# Patient Record
Sex: FEMALE | Race: OTHER | HISPANIC OR LATINO | Employment: FULL TIME | URBAN - METROPOLITAN AREA
[De-identification: names, ages, dates, MRNs, and addresses within clinical notes are randomized per-mention and may not be internally consistent; named-entity substitution may affect disease eponyms.]

---

## 2018-04-16 ENCOUNTER — HOSPITAL ENCOUNTER (EMERGENCY)
Facility: HOSPITAL | Age: 28
Discharge: HOME | End: 2018-04-16
Attending: EMERGENCY MEDICINE | Admitting: EMERGENCY MEDICINE
Payer: COMMERCIAL

## 2018-04-16 ENCOUNTER — APPOINTMENT (EMERGENCY)
Dept: RADIOLOGY | Facility: HOSPITAL | Age: 28
End: 2018-04-16
Payer: COMMERCIAL

## 2018-04-16 VITALS
HEART RATE: 66 BPM | OXYGEN SATURATION: 100 % | SYSTOLIC BLOOD PRESSURE: 100 MMHG | WEIGHT: 135 LBS | TEMPERATURE: 98.1 F | DIASTOLIC BLOOD PRESSURE: 66 MMHG | RESPIRATION RATE: 20 BRPM

## 2018-04-16 DIAGNOSIS — O20.0 THREATENED MISCARRIAGE: Primary | ICD-10-CM

## 2018-04-16 LAB
ABO GROUP BLD: NORMAL
B-HCG SERPL-ACNC: ABNORMAL MIU/ML
BACTERIA UR QL AUTO: ABNORMAL /HPF
BASOPHILS # BLD AUTO: 0.01 THOUSANDS/ΜL (ref 0–0.1)
BASOPHILS NFR BLD AUTO: 0 % (ref 0–1)
BILIRUB UR QL STRIP: NEGATIVE
BLD GP AB SCN SERPL QL: NEGATIVE
CLARITY UR: CLEAR
COLOR UR: YELLOW
COLOR, POC: YELLOW
EOSINOPHIL # BLD AUTO: 0.12 THOUSAND/ΜL (ref 0–0.61)
EOSINOPHIL NFR BLD AUTO: 2 % (ref 0–6)
ERYTHROCYTE [DISTWIDTH] IN BLOOD BY AUTOMATED COUNT: 13.6 % (ref 11.6–15.1)
EXT PREG TEST URINE: POSITIVE
GLUCOSE UR STRIP-MCNC: NEGATIVE MG/DL
HCT VFR BLD AUTO: 40.6 % (ref 34.8–46.1)
HGB BLD-MCNC: 13.6 G/DL (ref 11.5–15.4)
HGB UR QL STRIP.AUTO: ABNORMAL
HYALINE CASTS #/AREA URNS LPF: ABNORMAL /LPF
KETONES UR STRIP-MCNC: ABNORMAL MG/DL
LEUKOCYTE ESTERASE UR QL STRIP: NEGATIVE
LYMPHOCYTES # BLD AUTO: 1.42 THOUSANDS/ΜL (ref 0.6–4.47)
LYMPHOCYTES NFR BLD AUTO: 27 % (ref 14–44)
MCH RBC QN AUTO: 28.8 PG (ref 26.8–34.3)
MCHC RBC AUTO-ENTMCNC: 33.5 G/DL (ref 31.4–37.4)
MCV RBC AUTO: 86 FL (ref 82–98)
MONOCYTES # BLD AUTO: 0.43 THOUSAND/ΜL (ref 0.17–1.22)
MONOCYTES NFR BLD AUTO: 8 % (ref 4–12)
NEUTROPHILS # BLD AUTO: 3.21 THOUSANDS/ΜL (ref 1.85–7.62)
NEUTS SEG NFR BLD AUTO: 63 % (ref 43–75)
NITRITE UR QL STRIP: NEGATIVE
NON-SQ EPI CELLS URNS QL MICRO: ABNORMAL /HPF
NRBC BLD AUTO-RTO: 0 /100 WBCS
PH UR STRIP.AUTO: 6.5 [PH] (ref 4.5–8)
PLATELET # BLD AUTO: 153 THOUSANDS/UL (ref 149–390)
PMV BLD AUTO: 9.9 FL (ref 8.9–12.7)
PROT UR STRIP-MCNC: ABNORMAL MG/DL
RBC # BLD AUTO: 4.73 MILLION/UL (ref 3.81–5.12)
RBC #/AREA URNS AUTO: ABNORMAL /HPF
RH BLD: POSITIVE
SP GR UR STRIP.AUTO: >=1.03 (ref 1–1.03)
SPECIMEN EXPIRATION DATE: NORMAL
UROBILINOGEN UR QL STRIP.AUTO: 1 E.U./DL
WBC # BLD AUTO: 5.2 THOUSAND/UL (ref 4.31–10.16)
WBC #/AREA URNS AUTO: ABNORMAL /HPF

## 2018-04-16 PROCEDURE — 81002 URINALYSIS NONAUTO W/O SCOPE: CPT | Performed by: EMERGENCY MEDICINE

## 2018-04-16 PROCEDURE — 81025 URINE PREGNANCY TEST: CPT | Performed by: EMERGENCY MEDICINE

## 2018-04-16 PROCEDURE — 76801 OB US < 14 WKS SINGLE FETUS: CPT

## 2018-04-16 PROCEDURE — 84702 CHORIONIC GONADOTROPIN TEST: CPT | Performed by: EMERGENCY MEDICINE

## 2018-04-16 PROCEDURE — 36415 COLL VENOUS BLD VENIPUNCTURE: CPT | Performed by: EMERGENCY MEDICINE

## 2018-04-16 PROCEDURE — 86850 RBC ANTIBODY SCREEN: CPT | Performed by: EMERGENCY MEDICINE

## 2018-04-16 PROCEDURE — 86901 BLOOD TYPING SEROLOGIC RH(D): CPT | Performed by: EMERGENCY MEDICINE

## 2018-04-16 PROCEDURE — 99284 EMERGENCY DEPT VISIT MOD MDM: CPT

## 2018-04-16 PROCEDURE — 85025 COMPLETE CBC W/AUTO DIFF WBC: CPT | Performed by: EMERGENCY MEDICINE

## 2018-04-16 PROCEDURE — 86900 BLOOD TYPING SEROLOGIC ABO: CPT | Performed by: EMERGENCY MEDICINE

## 2018-04-16 PROCEDURE — 81001 URINALYSIS AUTO W/SCOPE: CPT

## 2018-04-16 NOTE — ED PROVIDER NOTES
History  Chief Complaint   Patient presents with    Vaginal Bleeding - Pregnant     Pt states that she pregnant and is having vaginal bleeding with nausea  Pt thinks she is about 2 months pregnant  HPI     80-year-old female  presents for vaginal bleeding  The patient's two months by dates pregnant  Recently moved from [place] after moving from [facility] to in December of last year  He has only been in the [facility] for three weeks  Reports that today she developed suprapubic abdominal pain with associated vaginal bleeding described as a few clots that has since improved  Pain is aching  Does not radiate  No other associated symptoms no other modifying factors  Last normal menstrual period was   She denies any vaginal discharge  No fevers or chills  Assessment plan vaginal bleeding early pregnancy with pelvic pain  Will do transvaginal ultrasound the patient had a transabdominal that was equivocal and did not show any intrauterine pregnancy  Will check type and screen as well as a beta HCG and CBC  Prior to Admission Medications   Prescriptions Last Dose Informant Patient Reported? Taking? Prenatal Vit-Fe Fumarate-FA (PRENATAL VITAMIN PO)   Yes Yes   Sig: Take by mouth      Facility-Administered Medications: None       History reviewed  No pertinent past medical history  Past Surgical History:   Procedure Laterality Date     SECTION         History reviewed  No pertinent family history  I have reviewed and agree with the history as documented  Social History   Substance Use Topics    Smoking status: Never Smoker    Smokeless tobacco: Never Used    Alcohol use No        Review of Systems   Constitutional: Negative for chills, fatigue and fever  Eyes: Negative for photophobia and visual disturbance  Respiratory: Negative for cough and shortness of breath  Cardiovascular: Negative for chest pain, palpitations and leg swelling  Gastrointestinal: Positive for abdominal pain  Negative for diarrhea, nausea and vomiting  Endocrine: Negative for polydipsia and polyuria  Genitourinary: Positive for vaginal bleeding  Negative for decreased urine volume, difficulty urinating, dysuria and frequency  Musculoskeletal: Negative for back pain, neck pain and neck stiffness  Skin: Negative for color change and rash  Allergic/Immunologic: Negative for environmental allergies and immunocompromised state  Neurological: Negative for dizziness and headaches  Hematological: Negative for adenopathy  Does not bruise/bleed easily  Psychiatric/Behavioral: Negative for dysphoric mood  The patient is not nervous/anxious  Physical Exam  ED Triage Vitals   Temperature Pulse Respirations Blood Pressure SpO2   04/16/18 1443 04/16/18 1443 04/16/18 1443 04/16/18 1443 04/16/18 1443   98 1 °F (36 7 °C) 81 18 104/56 99 %      Temp Source Heart Rate Source Patient Position - Orthostatic VS BP Location FiO2 (%)   04/16/18 1443 04/16/18 1443 04/16/18 1443 04/16/18 1443 --   Oral Monitor Sitting Left arm       Pain Score       04/16/18 1445       8           Orthostatic Vital Signs  Vitals:    04/16/18 1615 04/16/18 1645 04/16/18 1740 04/16/18 1845   BP: 116/73 99/61 97/58 100/66   Pulse: 64 70 62 66   Patient Position - Orthostatic VS: Sitting Sitting Lying Sitting       Physical Exam   Constitutional: She is oriented to person, place, and time  She appears well-developed and well-nourished  No distress  HENT:   Head: Normocephalic and atraumatic  Nose: Nose normal    Eyes: Conjunctivae and EOM are normal  Pupils are equal, round, and reactive to light  No scleral icterus  Neck: Normal range of motion  Neck supple  No JVD present  No tracheal deviation present  No thyromegaly present  Cardiovascular: Normal rate, regular rhythm, normal heart sounds and intact distal pulses  Exam reveals no gallop and no friction rub      Pulmonary/Chest: Effort normal and breath sounds normal  No respiratory distress  She has no wheezes  She has no rales  She exhibits no tenderness  Abdominal: Soft  Bowel sounds are normal  She exhibits no distension and no mass  There is tenderness (suprapubic)  There is no rebound and no guarding  No hernia  Musculoskeletal: Normal range of motion  She exhibits no edema, tenderness or deformity  Neurological: She is alert and oriented to person, place, and time  She has normal reflexes  No cranial nerve deficit  Coordination normal    Skin: Skin is warm and dry  She is not diaphoretic  No erythema  Psychiatric: She has a normal mood and affect  Her behavior is normal    Nursing note and vitals reviewed        ED Medications  Medications - No data to display    Diagnostic Studies  Results Reviewed     Procedure Component Value Units Date/Time    Quantitative hCG [[id number]]  (Abnormal) Collected:  04/16/18 1731    Lab Status:  Final result Specimen:  Blood from Arm, Right Updated:  04/16/18 1821     HCG, Quant 25,326 (H) mIU/mL     Narrative:          Expected Ranges:     Approximate               Approximate HCG  Gestation age          Concentration ( mIU/mL)  _____________          ______________________   [facility]                      HCG values  0 2-1                       5-50  1-2                           2-3                         100-5000  3-4                         500-06960  4-5                         1000-35596  5-6                         [id number]  6-8                         [id number]  8-12                        [id number]    CBC and differential [[id number]]  (Normal) Collected:  04/16/18 1731    Lab Status:  Final result Specimen:  Blood from Arm, Right Updated:  04/16/18 1748     WBC 5 20 Thousand/uL      RBC 4 73 Million/uL      Hemoglobin 13 6 g/dL      Hematocrit 40 6 %      MCV 86 fL      MCH 28 8 pg      MCHC 33 5 g/dL      RDW 13 6 %      MPV 9 9 fL      Platelets 084 Thousands/uL      nRBC 0 /100 WBCs      Neutrophils Relative 63 %      Lymphocytes Relative 27 %      Monocytes Relative 8 %      Eosinophils Relative 2 %      Basophils Relative 0 %      Neutrophils Absolute 3 21 Thousands/µL      Lymphocytes Absolute 1 42 Thousands/µL      Monocytes Absolute 0 43 Thousand/µL      Eosinophils Absolute 0 12 Thousand/µL      Basophils Absolute 0 01 Thousands/µL     Urine Microscopic [[id number]]  (Abnormal) Collected:  04/16/18 1612    Lab Status:  Final result Specimen:  Urine from Urine, Other Updated:  04/16/18 1638     RBC, UA 4-10 (A) /hpf      WBC, UA 2-4 (A) /hpf      Epithelial Cells Moderate (A) /hpf      Bacteria, UA Occasional /hpf      Hyaline Casts, UA 3-5 (A) /lpf     POCT pregnancy, urine [[id number]]  (Normal) Resulted:  04/16/18 1609    Lab Status:  Final result Updated:  04/16/18 1618     EXT PREG TEST UR (Ref: Negative) POSITIVE    POCT urinalysis dipstick [[id number]]  (Normal) Resulted:  04/16/18 1609    Lab Status:  Final result Specimen:  Urine Updated:  04/16/18 1618     Color, UA yellow    ED Urine Macroscopic [[id number]]  (Abnormal) Collected:  04/16/18 1612    Lab Status:  Final result Specimen:  Urine Updated:  04/16/18 1609     Color, UA Yellow     Clarity, UA Clear     pH, UA 6 5     Leukocytes, UA Negative     Nitrite, UA Negative     Protein, UA Trace (A) mg/dl      Glucose, UA Negative mg/dl      Ketones, UA Trace (A) mg/dl      Urobilinogen, UA 1 0 E U /dl      Bilirubin, UA Negative     Blood, UA Moderate (A)     Specific Gravity, UA >=1 030    Narrative:       CLINITEK RESULT                 US OB < 14 weeks with transvaginal   Final Result by Phil Bonilla MD (04/16 1738)      Single intrauterine pregnancy of 5-6 weeks gestational age  Fetal cardiac activity not detected, concerning for fetal demise  Recommend follow-up exam within one week to determine viability              Workstation performed: [id number]               Procedures  Procedures      Phone Consults  ED Phone Contact    ED Course  ED Course as of Apr 18 0514   Mon Apr 16, 2018   [id number] Spoke with [facility] resident, they are aware of the patient and will follow up with her                                MDM  Number of Diagnoses or Management Options  Threatened miscarriage: new and requires workup  Diagnosis management comments: Transvaginal ultrasound shows an intrauterine pregnancy with no fetal heart rate consistent with possible fetal demise  I spoke with the on-call OBGYN resident to establish follow-up for the patient, they are aware of the patient and the patient was given their number is well  She will have a repeat beta HCG in 48 hours and follow up with the OBGYN clinic       Amount and/or Complexity of Data Reviewed  Clinical lab tests: reviewed and ordered  Tests in the radiology section of CPT®: reviewed and ordered  Tests in the medicine section of CPT®: reviewed and ordered  Independent visualization of images, tracings, or specimens: yes      CritCare Time    Disposition  Final diagnoses:   Threatened miscarriage     Time reflects when diagnosis was documented in both MDM as applicable and the Disposition within this note     Time User Action Codes Description Comment    4/16/2018  7:07 PM Gaston Larger Add [O20 0] Threatened miscarriage       ED Disposition     ED Disposition Condition Comment    Discharge  [place] discharge to home/self care      Condition at discharge: Good        Follow-up Information     Follow up With Specialties Details Why [address] [facility] Obstetrics and Gynecology Schedule an appointment as soon as possible for a visit in 2 days  [address] [address] [phone]        Discharge Medication List as of 4/16/2018  7:09 PM      CONTINUE these medications which have NOT CHANGED    Details   Prenatal Vit-Fe Fumarate-FA (PRENATAL VITAMIN PO) Take by mouth, Historical Med             Outpatient Discharge Orders  hCG, quantitative   Standing Status: Future  Standing Exp  Date: 04/16/19         ED Provider  Attending physically available and evaluated Margaret Clayton I managed the patient along with the ED Attending      Electronically Signed by         Ting Jackson DO  04/18/18 An

## 2018-04-16 NOTE — DISCHARGE INSTRUCTIONS
Amenaza de aborto natural   LO QUE NECESITA SABER:   Medardo Million de aborto ocurre cuando se tiene sangrado vaginal dentro de las primeras 20 semanas de embarazo  Eso indica que podría ocurrir un aborto natural  Medardo Million de un aborto natural también se le conoce thao naresh amenaza de pérdida del [facility]  INSTRUCCIONES SOBRE EL SAPNA HOSPITALARIA:   Regrese a la jose miguel de emergencias si:   · Se siente débil o que se desmaya  · Tiene dolor o cólicos en el abdomen o en la espalda que empeoran  · Tiene sangrado vaginal que en naresh hora empapa por lo menos 1 toalla sanitaria  · Le sale un material que parece tejido o coágulos grandes  Comuníquese con braden médico o obstreta si:   · Usted tiene fiebre  · Tiene problemas para orinar, siente ardor o la necesidad de orinar con frecuencia  · Tiene sangrado vaginal nuevo o que empeora  · Tiene dolor o comezón vaginal o flujo vaginal de color amarillo o christos o maloliente  · Usted tiene preguntas o inquietudes acerca de braden condición o cuidado  Cuidados personales:  Los siguientes podrían ayudar a controlar los síntomas y disminuir braden riesgo de un aborto natural:  · No se ponga nada dentro de braden vagina  No tenga relaciones sexuales, no tome duchas vaginales ni use tampones  Estas acciones pueden aumentar braden riesgo de naresh infección o de un aborto natural      · Repose según le indicaron  No practique ningún ejercicio ni actividades vigorosas  Estas actividades pueden causar un parto prematuro o un aborto natural  Pregunte a braden médico cuáles actividades físicas son las apropiadas para usted  Manténgase saludable yonathan el embarazo:   · Consuma alimentos saludables y variados  Los alimentos sanos pueden ayudarla a obtener las proteínas y calorías adicionales que usted necesita yonathan el [facility]  Los alimentos saludables incluyen frutas, verduras, pan integral, productos lácteos bajos en grasa, frijoles, gabe magras y pescado   Evite la carne y pescado crudos o que no estén cocinados completamente  Consulte con palmer médico en [place] que sea necesario que siga naresh dieta especial      · [place] vitaminas prenatales según las indicaciones  Estas ayudan a obtener la cantidad correcta de vitaminas y minerales  También podrían ayudar a disminuir el riesgo de ciertos defectos de nacimiento  · No consuma alcohol ni drogas ilegales  Estos pueden aumentar el riesgo de un aborto espontáneo o perjudicar al bebé  · No fume  La nicotina y otros químicos en los cigarrillos y cigarros pueden perjudicar a palmer bebé y provocar un aborto natural o un parto prematuro  Pida información a palmer médico si usted actualmente fuma y necesita ayuda para dejar de fumar  Los cigarrillos electrónicos o tabaco sin humo todavía contienen nicotina  No use estos productos  · Disminuya el riesgo de naresh infección  Siempre lave jon braden antes de comer o preparar alimentos  No pase tiempo con gente que está enferma  Pregúntele a palmer médico si necesita vacunas thao la vacuna contra la gripe o la hepatitis B  Las vacunas pueden disminuir palmer riesgo de contraer infecciones que pueden causar un aborto espontáneo  · Controle jon afecciones médicas  Charley Plaster control palmer presión arterial y azúcar en la bret  Mantenga un peso saludable yonathan Chiara Kincaid  Programe naresh lizzy con palmer obstétrico thao se le indique:  Es posible que usted deba acudir a consulta con palmer ginecólogo frecuentemente para que le ordene ecografías o más exámenes de Elsie  Anote jon preguntas para que se acuerde de hacerlas yonathan jon visitas  © 2017 [address] Information is for End User's use only and may not be sold, redistributed or otherwise used for commercial purposes  All illustrations and images included in CareNotes® are the copyrighted property of A D A M , Inc  or Duc aVlenzuela  Esta información es sólo para uso en educación   Palmer intención no es darle un consejo [employer] enfermedades o tratamientos  Colsulte con braden Murlene Lusty farmacéutico antes de seguir cualquier régimen médico para saber si es seguro y efectivo para usted

## 2018-04-19 ENCOUNTER — OFFICE VISIT (OUTPATIENT)
Dept: OBGYN CLINIC | Facility: HOSPITAL | Age: 28
End: 2018-04-19
Payer: COMMERCIAL

## 2018-04-19 VITALS
WEIGHT: 134 LBS | SYSTOLIC BLOOD PRESSURE: 101 MMHG | HEIGHT: 64 IN | DIASTOLIC BLOOD PRESSURE: 66 MMHG | BODY MASS INDEX: 22.88 KG/M2 | HEART RATE: 65 BPM

## 2018-04-19 DIAGNOSIS — Z11.3 SCREEN FOR STD (SEXUALLY TRANSMITTED DISEASE): Primary | ICD-10-CM

## 2018-04-19 PROBLEM — N91.2 AMENORRHEA: Status: ACTIVE | Noted: 2018-04-19

## 2018-04-19 PROCEDURE — 99213 OFFICE O/P EST LOW 20 MIN: CPT | Performed by: OBSTETRICS & GYNECOLOGY

## 2018-04-19 PROCEDURE — 87591 N.GONORRHOEAE DNA AMP PROB: CPT | Performed by: STUDENT IN AN ORGANIZED HEALTH CARE EDUCATION/TRAINING PROGRAM

## 2018-04-19 PROCEDURE — 87491 CHLMYD TRACH DNA AMP PROBE: CPT | Performed by: STUDENT IN AN ORGANIZED HEALTH CARE EDUCATION/TRAINING PROGRAM

## 2018-04-19 NOTE — PROGRESS NOTES
GYN visit  Follow up ER for threatened  vs anembryonic pregnancy      28 yo  with LMP of 2018 on no contraception presenting as a follow up from ED for which she went for vaginal bleeding and cramping  Patient was seen in ED on 2018 with vaginal bleeding and abdominal pain and was found to have a BHCG of 25,346 with a transvaginal ultrasound showing a 13mm gestational sac and a fetal pole measuring 4mm with no fetal heart beat  She was sent home with precautions and a BHCG blood work to follow up with OBGYN as soon as possible  She presents today with with scant vaginal bleeding with increase discharge and cramping  Patient has an OB history that is significant for 3  section deliveries  This was unplanned however desired pregnancy if in fact it is a viable pregnancy  Objective:  /66   Pulse 65   Ht 5' 4" (1 626 m)   Wt 60 8 kg (134 lb)   LMP 2018   BMI 23 00 kg/m²   General: Appears calm and in no acute distress  Abdomen: Soft, non-tender, no palpable masses, no rebound or guarding  SSE: No blood in vaginal vault, cervix appears closed, moderate white mucoid discharge, no lesions  Pelvic examination: Normal genitalia with no lesions noted, small mobile uterus, no adnexal masses palpated, no CMT, no tenderness    TVUS:  A single gestational sac is noted in the uterus with a yolk sac measuring 7 x 5 mm  A possible fetal pole is visualized and measure 2 mm  No andexal masses noted    Assessment/Plan:  28 yo  with amenorrhea and a visibile intrauterine gestational sac with abnormal appearing yolk sac most likely representing an early failed pregnancy  Patient was counseled on todays findings and based on what we see this most likely is an abnormal pregnancy s  Given this is a desired pregnancy patient was offered laboratory testing with BHCG and progesterone or repeat ultrasound 1 week from the original ultrasound completed on 2018   Patient desires a repeat transvaginal ultrasound     - Return for viability scan 4/21/2018 or after  - Discussed miscarriage precautions and present to ED with excessive bleeding (2 pads per hour for 2 consecutive hours) or signs of infection    Discussed plan and ultrasound completed with Dr Junior Alexandra

## 2018-04-20 LAB
CHLAMYDIA DNA CVX QL NAA+PROBE: NORMAL
N GONORRHOEA DNA GENITAL QL NAA+PROBE: NORMAL

## 2018-04-24 ENCOUNTER — OFFICE VISIT (OUTPATIENT)
Dept: OBGYN CLINIC | Facility: HOSPITAL | Age: 28
End: 2018-04-24
Payer: COMMERCIAL

## 2018-04-24 VITALS
BODY MASS INDEX: 22.49 KG/M2 | WEIGHT: 135 LBS | HEIGHT: 65 IN | SYSTOLIC BLOOD PRESSURE: 113 MMHG | DIASTOLIC BLOOD PRESSURE: 58 MMHG

## 2018-04-24 DIAGNOSIS — O02.0 ANEMBRYONIC PREGNANCY: Primary | ICD-10-CM

## 2018-04-24 PROCEDURE — 99203 OFFICE O/P NEW LOW 30 MIN: CPT | Performed by: OBSTETRICS & GYNECOLOGY

## 2018-04-24 RX ORDER — MISOPROSTOL 200 UG/1
800 TABLET ORAL ONCE
Status: COMPLETED | OUTPATIENT
Start: 2018-04-24 | End: 2018-04-24

## 2018-04-24 RX ADMIN — MISOPROSTOL 800 MCG: 200 TABLET ORAL at 17:55

## 2018-04-24 NOTE — PATIENT INSTRUCTIONS
Aborto natural   LO QUE NECESITA SABER:   Un aborto natural es la pérdida del feto antes de la 20ª semana de Tim  Un aborto natural también se conoce thao aborto espóntaneo o naresh pérdida temprana del Tim  INSTRUCCIONES SOBRE EL SAPNA HOSPITALARIA:   Busque atención médica de inmediato si:   · Tiene secreción o pus malolientes saliendo de braden vagina  · Usted tiene sangrado vaginal abundante y en el transcurso de naresh hora empapa más de 1 toalla Natali  · Usted tiene dolor abdominal intenso  · Usted siente que braden corazón está latiendo más rápido de lo normal      · Usted se siente sumamente mareado o débil  Pregúntele a braden Samule Rode vitaminas y minerales son adecuados para usted  · Usted tiene naresh temperatura superior a los 100 4°F o escalofrios  · Usted tiene naresh enorme tristeza, carvalho o siente que no puede lidiar con lo que le ha pasado  · Usted tiene preguntas o inquietudes acerca de braden condición o cuidado  Cuidados personales:   · No se ponga nada dentro de braden vagina por 2 semanas o según las indicaciones  No use tampones, duchas vaginales ni lleve acabo el acto sexual  Estas acciones pueden causar naresh infección y dolor  · Use toallas sanitarias según las necesidades  Es posible que usted tenga sangrado o manchado leve por 2 semanas  · No tome mauricio de kenia ni vaya a nadar por 2 semanas o según las indicaciones  Estas acciones pueden aumentar braden riesgo de contraer naresh infección  Sólo tome duchas  · Descanse tanto thao sea necesario  Empiece a hacer un poco más día a día  Regrese a jon actividades diarias thao se le haya indicado  · Consulte con braden médico sobre los métodos anticonceptivos  En [place] que le interese prevenir otro [facility], pregunte a braden médico cuál método [employer] recomienda  · Unirse a un jamey de apoyo o la terapia emocional puede ser beneficioso para afrontar jon sentimientos    Un aborto natural puede ser muy dificil para [place], [place] nara y otros miembros de palmer trang  Después de [facility] pérdida del embarazo se pueden sentir muchos sentimientos  Usted podría sentir naresh carvalho intensa u otros sentimientos  Podría ser beneficioso hablar con Angel Cary, con un familiar o con un consejero acerca de jon sentimientos  Usted también podría estar preocupada por la posibilidad de sufrir otro aborto natural  Consulte con palmer médico acerca de jon preocupaciones  El médico podría ayudarla a disminuir el riesgo de otro aborto  También le podría ayudar a encontrar formas para sobrellevar la carvalho y aflicción que siente  Para más información:   · [employer]nec[employer]  [address] [place] [address]  Phone: [phone]  Phone: [phone]  Web Address: http://arcbazar.com/  [employer]  · [facility]  [address] [facility] , [address] [place] [address]  Web Address: [employer] be  com  Acuda a jon consultas de control con palmer médico según le indicaron  Usted podría necesitar acudir con palmer médico para que le ordene exámenes de bret o naresh ecografía  Anote jon preguntas para que se acuerde de hacerlas yonathan jon visitas  © 2017 [facility] Information is for End User's use only and may not be sold, redistributed or otherwise used for commercial purposes  All illustrations and images included in CareNotes® are the copyrighted property of A D A M , Inc  or Duc Valenzuela  Esta información es sólo para uso en educación  Palmer intención no es darle un consejo médico sobre enfermedades o tratamientos  Colsulte con palmer Dewain Racer farmacéutico antes de seguir cualquier régimen médico para saber si es seguro y efectivo para usted

## 2018-04-24 NOTE — PROGRESS NOTES
GYN VISIT  Follow up viability scan  4/24/2018  Anjelica Hoang  [id number] used  Patient is a 24-year-old G 4 P 3 003 LMP of 2/14/18 on no contraception presenting for follow-up viability scan following initial scan on 04/19/2018 which showed a most likely anembryonic pregnancy with abnormally enlarged yolk sac  Patient initially presented to the ED on 04/14/18 with vaginal bleeding and abdominal pain and at that time was found to have a beta HCG of 25,346 transvaginal ultrasound showing a 13 mm gestational sac and fetal pole measuring 4 mm with no fetal heartbeat  She was sent to follow-up with OBGYN at that time was seen on 4/19/18 by myself  On 04/19/2018 she underwent a repeat pelvic ultrasound which showed a single gestational sac in the uterus with a yolk sac measuring 7 x 5 mm which is not normally large yolk sac  Patient was given the option at that time to have serial beta HCG or return for a follow-up ultrasound and she decided to follow-up for ultrasound which she is presenting for today  Today patient has complaints of continued bleeding which she has had to use 3 pads a day  She has passed clots but denies passing any tissue at this time  She denies any other additional complaints      /58   Ht 5' 5" (1 651 m)   Wt 61 2 kg (135 lb)   LMP 02/14/2018 (Exact Date)   BMI 22 47 kg/m²   General:  Patient appears calm, pleasant and in no acute distress  Pelvic exam:  Minimal active bleeding noted from vagina, Small mobile uterus, no cervical motion tenderness, no adnexal masses palpated    Transvaginal ultrasound:  A single gestational sac in the uterus measuring 2 x 0 7 x 2 9 cm  The gestational sac appears to be collapsed and a teardrop form near the lower uterine segment  A yolk sac measuring 5 x 4 mm present the gestational sac which is smaller than previously measured on 04/19/2018  No adnexal masses seen  Trace amount of free fluid in pelvis which can be normal in a premenopausal female who is having vaginal bleeding    A/P:  30 yo  presenting with anembryonic pregnancy  Discussed miscarriage rate of approximately 20% with patient and that most first trimester are secondary to genetic abnormalities  Patient was reassured that there is nothing that she could have done differently that would of resulted in a different outcome  Patient was counseled on management options including expectant management, medical management with Cytotec placement and surgical management  Risks and benefits of each option were discussed in depth  Patient decided for Cytotec placement at this time  800 mcg of Cytotec were placed vaginally using sterile gloves  Patient was counseled that she would most likely pass the pregnancy in entirety and have vaginal bleeding  She was counseled that in side effects this headache include low-grade fever, chills and diarrhea  Patient was counseled following passage of pregnancy if she continues to have excessive bleeding that is 2 pads an hour for 2 consecutive hours to call or proceed to Ed  To follow up this Friday or sooner if she needs        D/w Dr Massimo Torrez who also assisted with ultrasound    Divya Moore MD

## 2018-04-26 NOTE — ED ATTENDING ATTESTATION
aSri Gomez MD, saw and evaluated the patient  I have discussed the patient with the resident/non-physician practitioner and agree with the resident's/non-physician practitioner's findings, Plan of Care, and MDM as documented in the resident's/non-physician practitioner's note, except where noted  All available labs and Radiology studies were reviewed  At this point I agree with the current assessment done in the Emergency Department  I have conducted an independent evaluation of this patient a history and physical is as follows:    Patient presents with vaginal bleeding and suprapubic abdominal pain  Patient is currently 8 weeks pregnant  Patient recently room moved from [place] to [facility] and has not established care  No additional complaints  Exam: AAOx3, NAD, RRR, CTA, S/NT/ND  A/P:  Vaginal bleeding  Will check serum HCG, type and screen, and obtain ultrasound to evaluate for IUP      Critical Care Time  CritCare Time    Procedures

## 2018-04-27 ENCOUNTER — HOSPITAL ENCOUNTER (EMERGENCY)
Facility: HOSPITAL | Age: 28
Discharge: HOME | End: 2018-04-27
Attending: EMERGENCY MEDICINE | Admitting: EMERGENCY MEDICINE
Payer: COMMERCIAL

## 2018-04-27 ENCOUNTER — APPOINTMENT (EMERGENCY)
Dept: RADIOLOGY | Facility: HOSPITAL | Age: 28
End: 2018-04-27
Payer: COMMERCIAL

## 2018-04-27 VITALS
OXYGEN SATURATION: 99 % | TEMPERATURE: 98.3 F | SYSTOLIC BLOOD PRESSURE: 94 MMHG | RESPIRATION RATE: 16 BRPM | HEART RATE: 63 BPM | WEIGHT: 134 LBS | DIASTOLIC BLOOD PRESSURE: 50 MMHG | BODY MASS INDEX: 22.3 KG/M2

## 2018-04-27 DIAGNOSIS — O03.9 ABORTION: Primary | ICD-10-CM

## 2018-04-27 LAB
ABO GROUP BLD: NORMAL
BACTERIA UR QL AUTO: ABNORMAL /HPF
BASOPHILS # BLD AUTO: 0.02 THOUSANDS/ΜL (ref 0–0.1)
BASOPHILS NFR BLD AUTO: 0 % (ref 0–1)
BILIRUB UR QL STRIP: ABNORMAL
BLD GP AB SCN SERPL QL: NEGATIVE
CLARITY UR: CLEAR
COLOR UR: YELLOW
COLOR, POC: NORMAL
EOSINOPHIL # BLD AUTO: 0.08 THOUSAND/ΜL (ref 0–0.61)
EOSINOPHIL NFR BLD AUTO: 1 % (ref 0–6)
ERYTHROCYTE [DISTWIDTH] IN BLOOD BY AUTOMATED COUNT: 13.5 % (ref 11.6–15.1)
GLUCOSE UR STRIP-MCNC: NEGATIVE MG/DL
HCT VFR BLD AUTO: 34.9 % (ref 34.8–46.1)
HGB BLD-MCNC: 12.2 G/DL (ref 11.5–15.4)
HGB UR QL STRIP.AUTO: ABNORMAL
HYALINE CASTS #/AREA URNS LPF: ABNORMAL /LPF
KETONES UR STRIP-MCNC: ABNORMAL MG/DL
LEUKOCYTE ESTERASE UR QL STRIP: NEGATIVE
LYMPHOCYTES # BLD AUTO: 1.64 THOUSANDS/ΜL (ref 0.6–4.47)
LYMPHOCYTES NFR BLD AUTO: 19 % (ref 14–44)
MCH RBC QN AUTO: 29.1 PG (ref 26.8–34.3)
MCHC RBC AUTO-ENTMCNC: 35 G/DL (ref 31.4–37.4)
MCV RBC AUTO: 83 FL (ref 82–98)
MONOCYTES # BLD AUTO: 0.51 THOUSAND/ΜL (ref 0.17–1.22)
MONOCYTES NFR BLD AUTO: 6 % (ref 4–12)
NEUTROPHILS # BLD AUTO: 6.43 THOUSANDS/ΜL (ref 1.85–7.62)
NEUTS SEG NFR BLD AUTO: 74 % (ref 43–75)
NITRITE UR QL STRIP: NEGATIVE
NON-SQ EPI CELLS URNS QL MICRO: ABNORMAL /HPF
NRBC BLD AUTO-RTO: 0 /100 WBCS
PH UR STRIP.AUTO: 7.5 [PH] (ref 4.5–8)
PLATELET # BLD AUTO: 143 THOUSANDS/UL (ref 149–390)
PMV BLD AUTO: 10 FL (ref 8.9–12.7)
PROT UR STRIP-MCNC: ABNORMAL MG/DL
RBC # BLD AUTO: 4.19 MILLION/UL (ref 3.81–5.12)
RBC #/AREA URNS AUTO: ABNORMAL /HPF
RH BLD: POSITIVE
SP GR UR STRIP.AUTO: 1.02 (ref 1–1.03)
SPECIMEN EXPIRATION DATE: NORMAL
UROBILINOGEN UR QL STRIP.AUTO: 1 E.U./DL
WBC # BLD AUTO: 8.69 THOUSAND/UL (ref 4.31–10.16)
WBC #/AREA URNS AUTO: ABNORMAL /HPF

## 2018-04-27 PROCEDURE — 81002 URINALYSIS NONAUTO W/O SCOPE: CPT | Performed by: EMERGENCY MEDICINE

## 2018-04-27 PROCEDURE — 76817 TRANSVAGINAL US OBSTETRIC: CPT

## 2018-04-27 PROCEDURE — 85025 COMPLETE CBC W/AUTO DIFF WBC: CPT | Performed by: EMERGENCY MEDICINE

## 2018-04-27 PROCEDURE — 86901 BLOOD TYPING SEROLOGIC RH(D): CPT | Performed by: EMERGENCY MEDICINE

## 2018-04-27 PROCEDURE — 86850 RBC ANTIBODY SCREEN: CPT | Performed by: EMERGENCY MEDICINE

## 2018-04-27 PROCEDURE — 86900 BLOOD TYPING SEROLOGIC ABO: CPT | Performed by: EMERGENCY MEDICINE

## 2018-04-27 PROCEDURE — 36415 COLL VENOUS BLD VENIPUNCTURE: CPT | Performed by: EMERGENCY MEDICINE

## 2018-04-27 PROCEDURE — 96374 THER/PROPH/DIAG INJ IV PUSH: CPT

## 2018-04-27 PROCEDURE — 81001 URINALYSIS AUTO W/SCOPE: CPT

## 2018-04-27 PROCEDURE — 76816 OB US FOLLOW-UP PER FETUS: CPT

## 2018-04-27 PROCEDURE — 99284 EMERGENCY DEPT VISIT MOD MDM: CPT

## 2018-04-27 RX ORDER — MISOPROSTOL 200 UG/1
800 TABLET ORAL ONCE
Status: COMPLETED | OUTPATIENT
Start: 2018-04-27 | End: 2018-04-27

## 2018-04-27 RX ORDER — IBUPROFEN 600 MG/1
600 TABLET ORAL EVERY 6 HOURS PRN
Qty: 20 TABLET | Refills: 0 | Status: SHIPPED | OUTPATIENT
Start: 2018-04-27 | End: 2018-05-14

## 2018-04-27 RX ORDER — MORPHINE SULFATE 4 MG/ML
4 INJECTION, SOLUTION INTRAMUSCULAR; INTRAVENOUS ONCE
Status: COMPLETED | OUTPATIENT
Start: 2018-04-27 | End: 2018-04-27

## 2018-04-27 RX ORDER — OXYCODONE HYDROCHLORIDE AND ACETAMINOPHEN 5; 325 MG/1; MG/1
1 TABLET ORAL EVERY 6 HOURS PRN
Qty: 5 TABLET | Refills: 0 | Status: SHIPPED | OUTPATIENT
Start: 2018-04-27 | End: 2018-05-07

## 2018-04-27 RX ADMIN — MORPHINE SULFATE 4 MG: 4 INJECTION INTRAVENOUS at 19:03

## 2018-04-27 RX ADMIN — MISOPROSTOL 800 MCG: 200 TABLET ORAL at 23:29

## 2018-04-27 NOTE — ED ATTENDING ATTESTATION
I, Fiordaliza Sykes DO, saw and evaluated the patient  I have discussed the patient with the resident/non-physician practitioner and agree with the resident's/non-physician practitioner's findings, Plan of Care, and MDM as documented in the resident's/non-physician practitioner's note, except where noted  All available labs and Radiology studies were reviewed  At this point I agree with the current assessment done in the Emergency Department  I have conducted an independent evaluation of this patient a history and physical is as follows:      Critical Care Time  CritCare Time    Procedures     29 yr old fem  with recent fetal demise was tx with misiprositol on Tues  Started with bleeding and cramping last night  Now using up to 4 pads per hour  Exm: mod discomfort with blood in vault  Pln: H/H and OB consult

## 2018-04-27 NOTE — ED PROVIDER NOTES
History  Chief Complaint   Patient presents with    Abdominal Pain     OB put tablets into her vagina Tuesday to help complete the miscarriage  bleeding heavily  using 4 pads an hour  A 77-year-old female who is  with current nonviable pregnancy; presents with vaginal bleeding and lower abdominal cramping  Patient states she had Cytotec placed in her vagina on Tuesday to induce  secondary to her nonviable pregnancy  Patient states she did initially have bleeding however yesterday it significantly worsen  Patient reports using about four pads in an hour for the past several hours  Patient states her lower abdominal cramping has also significantly worsen, stating she "feels as if her ovaries are ripping "  Patient does also complain of fatigue, dizziness and lightheadedness  Patient otherwise denies fever, chills, chest pain, shortness of breath, nausea, vomiting, diarrhea, peripheral edema and rashes  A/P:  Vaginal bleeding and lower abdominal cramping s/p placement of Cytotec 3 days ago  Patient with moderate amount of blood within the vaginal vault  Will check lab work to evaluate for anemia  Will discuss with OB for further treatment and evaluation  History provided by:  Patient and medical records      None       History reviewed  No pertinent past medical history  Past Surgical History:   Procedure Laterality Date     SECTION         History reviewed  No pertinent family history  I have reviewed and agree with the history as documented  Social History   Substance Use Topics    Smoking status: Never Smoker    Smokeless tobacco: Never Used    Alcohol use No        Review of Systems   Gastrointestinal: Positive for abdominal pain ( lower abd cramping)  Genitourinary: Positive for vaginal bleeding  All other systems reviewed and are negative        Physical Exam  ED Triage Vitals   Temperature Pulse Respirations Blood Pressure SpO2   18 1837 18 183 04/27/18 1837 04/27/18 1837 04/27/18 1837   98 3 °F (36 8 °C) 88 16 103/63 100 %      Temp src Heart Rate Source Patient Position - Orthostatic VS BP Location FiO2 (%)   -- 04/27/18 2300 04/27/18 2300 04/27/18 2300 --    Monitor Lying Left arm       Pain Score       04/27/18 1837       Worst Possible Pain           Orthostatic Vital Signs  Vitals:    04/27/18 1837 04/27/18 2300   BP: 103/63 94/50   Pulse: 88 63   Patient Position - Orthostatic VS:  Lying       Physical Exam   General Appearance: alert and oriented, appears uncomfortable, non toxic appearing  Skin:  Warm, dry, intact; pale appearing  HEENT: atraumatic, normocephalic  Neck: Supple, trachea midline  Cardiac: RRR; no murmurs, rub, gallops  Pulmonary: lungs CTAB; no wheezes, rales, rhonchi  Gastrointestinal: abdomen soft, lower abdominal tenderness, nondistended; no guarding or rebound tenderness; good bowel sounds, no mass or bruits  Genitourinary: Chaperoned by PCT  Moderate amount of blood within vaginal vault, no active bleeding      Extremities:  no pedal edema, 2+ pulses; no calf tenderness, no clubbing, no cyanosis  Neuro:  no focal motor or sensory deficits, CN 2-12 grossly intact  Psych:  Normal mood and affect, normal judgement and insight      ED Medications  Medications   morphine (PF) 4 mg/mL injection 4 mg (4 mg Intravenous Given 4/27/18 1903)   misoprostol (CYTOTEC) tablet 800 mcg (800 mcg Vaginal Given 4/27/18 2329)       Diagnostic Studies  Results Reviewed     Procedure Component Value Units Date/Time    POCT urinalysis dipstick [[id number]]  (Normal) Resulted:  04/27/18 2039    Lab Status:  Final result Specimen:  Urine Updated:  04/27/18 2039     Color, UA see results    Urine Microscopic [[id number]]  (Abnormal) Collected:  04/27/18 1959    Lab Status:  Final result Specimen:  Urine Updated:  04/27/18 2037     RBC, UA 30-50 (A) /hpf      WBC, UA None Seen /hpf      Epithelial Cells None Seen /hpf      Bacteria, UA None Seen /hpf Hyaline Casts, UA None Seen /lpf     ED Urine Macroscopic [[id number]]  (Abnormal) Collected:  18    Lab Status:  Final result Specimen:  Urine Updated:  18     Color, UA Yellow     Clarity, UA Clear     pH, UA 7 5     Leukocytes, UA Negative     Nitrite, UA Negative     Protein, UA 30 (1+) (A) mg/dl      Glucose, UA Negative mg/dl      Ketones, UA 40 (2+) (A) mg/dl      Urobilinogen, UA 1 0 E U /dl      Bilirubin, UA Interference- unable to analyze (A)     Blood, UA Moderate (A)     Specific [place], UA 1 020    Narrative:       CLINITEK RESULT    CBC and differential [[id number]]  (Abnormal) Collected:  18    Lab Status:  Final result Specimen:  Blood from Arm, Right Updated:  18     WBC 8 69 Thousand/uL      RBC 4 19 Million/uL      Hemoglobin 12 2 g/dL      Hematocrit 34 9 %      MCV 83 fL      MCH 29 1 pg      MCHC 35 0 g/dL      RDW 13 5 %      MPV 10 0 fL      Platelets 000 (L) Thousands/uL      nRBC 0 /100 WBCs      Neutrophils Relative 74 %      Lymphocytes Relative 19 %      Monocytes Relative 6 %      Eosinophils Relative 1 %      Basophils Relative 0 %      Neutrophils Absolute 6 43 Thousands/µL      Lymphocytes Absolute 1 64 Thousands/µL      Monocytes Absolute 0 51 Thousand/µL      Eosinophils Absolute 0 08 Thousand/µL      Basophils Absolute 0 02 Thousands/µL                  US OB follow up transabdominal approach   Final Result by Zaina Calhoun MD ([id number])         1  Gestational sac without viable pregnancy in the endocervical canal consistent with   in progress  2   Thickened echogenic endometrium    Small amount of blood products not excluded in the endometrial canal          Workstation performed: [id number]         US OB transvaginal    (Results Pending)         Procedures  Procedures      Phone Consults  ED Phone Contact    ED Course  ED Course as of  Hemoglobin 13 on recent labs (11 days ago) Hemoglobin: 12 2    Pt complains of persistent bleeding, dizziness and cramping  Will discuss with OB for further treatment and evaluation  1 Spoke with OB, they are requesting pelvic US to evaluate for products of conception  Will order  They will be down to evaluate the child  2303 OB evaluated the patient and imaging results  Discussed management options with patient  Patient opts for re-dosing of cytotec with outpatient follow up  They will place cytotec tabs prior to discharge  [facility]  CritCare Time    Disposition  Final diagnoses:        Time reflects when diagnosis was documented in both MDM as applicable and the Disposition within this note     Time User Action Codes Description Comment    2018 11:05 PM Tiara Hines Add [O03 9]        ED Disposition     ED Disposition Condition Comment    Discharge  Bushra Anderson discharge to home/self care      Condition at discharge: Good        Follow-up Information     Follow up With Specialties Details [facility] Schedule an appointment as soon as possible for a visit on 2018 For re-evaluation [address] [id number]  [facility] Department Emergency Medicine Go to If symptoms worsen [address]  [phone]  ED, [address] [place] [address], [address]        Patient's Medications   Discharge Prescriptions    IBUPROFEN (MOTRIN) 600 MG TABLET    Take 1 tablet (600 mg total) by mouth every 6 (six) hours as needed for mild pain       Start Date: 2018 End Date: --       Order Dose: 600 mg       Quantity: 20 tablet    Refills: 0    OXYCODONE-ACETAMINOPHEN (PERCOCET) 5-325 MG PER TABLET    Take 1 tablet by mouth every 6 (six) hours as needed for moderate pain for up to 10 days Max Daily Amount: 4 tablets       Start Date: 4/27/2018 End Date: 5/7/2018       Order Dose: 1 tablet       Quantity: 5 tablet    Refills: 0     No discharge procedures on file  ED Provider  Attending physically available and evaluated Norma Alvarez I managed the patient along with the ED Attending      Electronically Signed by         Rosanna Delgado DO  04/27/18 7404

## 2018-04-28 NOTE — DISCHARGE INSTRUCTIONS
Aborto natural   LO QUE NECESITA SABER:   Un aborto natural es la pérdida del feto antes de la 20ª semana de Tim  Un aborto natural también se conoce thao aborto espóntaneo o naresh pérdida temprana del Tim  INSTRUCCIONES SOBRE EL SAPNA HOSPITALARIA:   Regrese a la jose miguel de emergencias si:   · Tiene secreción o pus malolientes saliendo de braden vagina  · Usted tiene sangrado vaginal abundante y en el transcurso de naresh hora empapa más de 1 toalla Ziebach  · Usted tiene dolor abdominal intenso  · Usted siente que braden corazón está latiendo más rápido de lo normal      · Usted se siente sumamente mareado o débil  Pregúntele a braden Carmen Jax vitaminas y minerales son adecuados para usted  · Usted tiene naresh temperatura superior a los 100 4°F o escalofrios  · Usted tiene naresh enorme tristeza, carvalho o siente que no puede lidiar con lo que le ha pasado  · Usted tiene preguntas o inquietudes acerca de braden condición o cuidado  Cuidados personales:   · No se ponga nada dentro de braden vagina por 2 semanas o según las indicaciones  No use tampones, duchas vaginales ni lleve acabo el acto sexual  Estas acciones pueden causar naresh infección y dolor  · Use toallas sanitarias según las necesidades  Es posible que usted tenga sangrado o manchado leve por 2 semanas  · No tome mauricio de kenia ni vaya a nadar por 2 semanas o según las indicaciones  Estas acciones pueden aumentar braden riesgo de contraer naresh infección  Sólo tome duchas  · Descanse tanto thao sea necesario  Empiece a hacer un poco más día a día  Regrese a jon actividades diarias thao se le haya indicado  · Consulte con braden médico sobre los métodos anticonceptivos  En [place] que le interese prevenir otro [facility], pregunte a braden médico cuál método [employer] recomienda  · Unirse a un jamey de apoyo o la terapia emocional puede ser beneficioso para afrontar jon sentimientos    Un aborto natural puede ser muy dificil para [place], [place] nara y otros miembros de palmer trang  Después de [facility] pérdida del embarazo se pueden sentir muchos sentimientos  Usted podría sentir naresh carvalho intensa u otros sentimientos  Podría ser beneficioso hablar con Lavon Bry, con un familiar o con un consejero acerca de jon sentimientos  Usted también podría estar preocupada por la posibilidad de sufrir otro aborto natural  Consulte con palmer médico acerca de jon preocupaciones  El médico podría ayudarla a disminuir el riesgo de otro aborto  También le podría ayudar a encontrar formas para sobrellevar la carvalho y aflicción que siente  Para más información:   · [employer]  [address] [place] [address]  Phone: [phone]  Phone: [phone]  Web Address: http://TreeRing/  ZEB  · March of [facility]  [address]  Web Address: [employer] be  com  Acuda a jon consultas de control con palmer médico según le indicaron  Usted podría necesitar acudir con palmer médico para que le ordene exámenes de bret o naresh ecografía  Anote jon preguntas para que se acuerde de hacerlas yonathan jon visitas  © 2017 2600 [facility] Information is for End User's use only and may not be sold, redistributed or otherwise used for commercial purposes  All illustrations and images included in CareNotes® are the copyrighted property of A D A M , Inc  or Duc Valenzuela  Esta información es sólo para uso en educación  Palmer intención no es darle un consejo médico sobre enfermedades o tratamientos  Colsulte con palmer [place] Taniya farmacéutico antes de seguir cualquier régimen médico para saber si es seguro y efectivo para usted

## 2018-04-28 NOTE — CONSULTS
Consultation - Gynecology   Parisa Brenner 29 y o  female MRN: [id number]  Unit/Bed#: ED 22 Encounter: [id number]    Assessment/Plan     Assessment:  30 yo   s/p Cytotec placement on 18 for suspected failed pregnancy, presentating today with vaginal bleeding and abdominal cramping    Plan:  1  Vaginal bleeding: In the setting of a failed pregnancy , s/p Cytotec placement about 3 days ago  VSS  hgb 13 6 () -> 12 2 ( ) Blood type O positive  No active vaginal bleeding noted on exam  Small amount of blood clots removed from vault  Pelvic U/S showed the  gestational sac without viable pregnancy in the endocervical canal consistent with   in progress  Patient is otherwise asymptomatic except mild-moderate  abdominal cramping at this time  Ambulated to bathroom without difficulties  Discussion with patient on expectant management vs  Medical management ( re-dosing cytotec) vs  Surgical manegement ( D&E)  Risks, benefits , and alternatives discussed with patient  Questions answered  Patient prefers medical management at this time   Discussion was held on the language line with the [address] [place] [address]   - Cytotec 800 mcg placed vaginally ( second dose)  Precautions given  2  Abdominal Cramping: Motrin / Tylenole PRN    3  Dispo: Discharge to home with precautions  Please follow up with signs of infection, hemodynamic instability, worsening pain , or worsening bleeding  Please call the [facility] for a follow up appointment  History of Present Illness   Physician Requesting Consult: Traci Kessler DO  Reason for Consult / Principal Problem: Vaginal bleeding s/p Cytotec placement for failed pregnancy  Subspeciality: General GYN     HPI: Parisa Brenner is a 29y o  year old female, LMP 18  who presents with one day hx of vaginal bleeding and abdominal cramping s/p Cytotec placement on 18 for suspected failed pregnancy  Patient denies any lightheadness, dizziness, syncope, nausea, vomiting, fever , chills, chest pain or dyspnea  Patient reports mild- moderate  abdominal cramping  No additional complaints  Consults    Review of Systems   Constitutional: Negative for chills and fever  Respiratory: Negative for cough, chest tightness, shortness of breath and wheezing  Cardiovascular: Negative for chest pain, palpitations and leg swelling  Gastrointestinal: Positive for abdominal pain  Negative for abdominal distention, constipation, diarrhea, nausea and vomiting  Cramping lower abdominal /pelvic pain   Genitourinary: Positive for pelvic pain and vaginal bleeding  Negative for dysuria and flank pain  Neurological: Negative for dizziness, syncope and light-headedness  Psychiatric/Behavioral: Negative for behavioral problems and confusion  Historical Information   History reviewed  No pertinent past medical history  Past Surgical History:   Procedure Laterality Date     SECTION       OB History    Para Term  AB Living   1             SAB TAB Ectopic Multiple Live Births                  # Outcome Date GA Lbr Juliocesar/2nd Weight Sex Delivery Anes PTL Lv   1 Current                 History reviewed  No pertinent family history  Social History   History   Alcohol Use No     History   Drug Use No     History   Smoking Status    Never Smoker   Smokeless Tobacco    Never Used       Meds/Allergies   all current active meds have been reviewed    No Known Allergies    Objective   Vitals: Blood pressure 103/63, pulse 88, temperature 98 3 °F (36 8 °C), resp  rate 16, weight 60 8 kg (134 lb), last menstrual period 2018, SpO2 100 %  Body mass index is 22 3 kg/m²      No intake or output data in the 24 hours ending 18 2152    Invasive Devices     Peripheral Intravenous Line            Peripheral IV 18 Right Antecubital less than 1 day                Physical Exam   Constitutional: She is oriented to person, place, and time  She appears well-developed and well-nourished  HENT:   Head: Normocephalic and atraumatic  Cardiovascular: Normal rate, regular rhythm and normal heart sounds  Pulmonary/Chest: Effort normal and breath sounds normal    Abdominal: Soft  She exhibits no distension  There is no tenderness  There is no rebound and no guarding  Genitourinary: Vagina normal and uterus normal    Genitourinary Comments: SSE: no active vaginal bleeding, small amount of blood clot evacuated from vault  Cervical os closed    No uterine tenderness or adnexal fullness on bimanual exam      No CMT   No abnormal vaginal dicharge   Neurological: She is alert and oriented to person, place, and time  Psychiatric: She has a normal mood and affect  Her behavior is normal    Nursing note and vitals reviewed  Pelvic US 18: IMPRESSION:        1  Gestational sac without viable pregnancy in the endocervical canal consistent with   in progress  2   Thickened echogenic endometrium  Small amount of blood products not excluded in the endometrial canal    Lab Results: I have personally reviewed pertinent reports  Imaging Studies: I have personally reviewed pertinent reports  Code Status: Full code    Counseling / Coordination of Care  Total floor / unit time spent today 30  minutes  Greater than 50% of total time was spent with the patient and / or family counseling and / or coordination of care        Srinivasan Vanegas   2018

## 2018-04-28 NOTE — ED NOTES
Patient transported to [facility] Susanna Benjamin RN  04/27/18 2036

## 2018-04-30 ENCOUNTER — OFFICE VISIT (OUTPATIENT)
Dept: OBGYN CLINIC | Facility: HOSPITAL | Age: 28
End: 2018-04-30
Payer: COMMERCIAL

## 2018-04-30 VITALS
WEIGHT: 133 LBS | HEIGHT: 65 IN | BODY MASS INDEX: 22.16 KG/M2 | SYSTOLIC BLOOD PRESSURE: 112 MMHG | TEMPERATURE: 98 F | DIASTOLIC BLOOD PRESSURE: 77 MMHG

## 2018-04-30 DIAGNOSIS — O02.1 MISSED ABORTION: Primary | ICD-10-CM

## 2018-04-30 DIAGNOSIS — R10.30 LOWER ABDOMINAL PAIN: ICD-10-CM

## 2018-04-30 PROCEDURE — 99213 OFFICE O/P EST LOW 20 MIN: CPT | Performed by: OBSTETRICS & GYNECOLOGY

## 2018-04-30 PROCEDURE — 88305 TISSUE EXAM BY PATHOLOGIST: CPT | Performed by: PATHOLOGY

## 2018-04-30 RX ORDER — IBUPROFEN 600 MG/1
600 TABLET ORAL ONCE
Status: SHIPPED | OUTPATIENT
Start: 2018-04-30

## 2018-04-30 NOTE — PATIENT INSTRUCTIONS
Aborto natural   LO QUE NECESITA SABER:   Un aborto natural es la pérdida del feto antes de la 20ª semana de Tim  Un aborto natural también se conoce thao aborto espóntaneo o naresh pérdida temprana del Tim  INSTRUCCIONES SOBRE EL SAPNA HOSPITALARIA:   Regrese a la jose miguel de emergencias si:   · Tiene secreción o pus malolientes saliendo de braden vagina  · Usted tiene sangrado vaginal abundante y en el transcurso de naresh hora empapa más de 1 toalla Natali  · Usted tiene dolor abdominal intenso  · Usted siente que braden corazón está latiendo más rápido de lo normal      · Usted se siente sumamente mareado o débil  Pregúntele a braden [place] vitaminas y minerales son adecuados para usted  · Usted tiene naresh temperatura superior a los 100 4°F o escalofrios  · Usted tiene naresh enorme tristeza, carvalho o siente que no puede lidiar con lo que le ha pasado  · Usted tiene preguntas o inquietudes acerca de braden condición o cuidado  Cuidados personales:   · No se ponga nada dentro de braden vagina por 2 semanas o según las indicaciones  No use tampones, duchas vaginales ni lleve acabo el acto sexual  Estas acciones pueden causar naresh infección y dolor  · Use toallas sanitarias según las necesidades  Es posible que usted tenga sangrado o manchado leve por 2 semanas  · No tome mauricio de kenia ni vaya a nadar por 2 semanas o según las indicaciones  Estas acciones pueden aumentar braden riesgo de contraer naresh infección  Sólo tome duchas  · Descanse tanto thao sea necesario  Empiece a hacer un poco más día a día  Regrese a jon actividades diarias thao se le haya indicado  · Consulte con braden médico sobre los métodos anticonceptivos  En [place] que le interese prevenir otro [facility], pregunte a braden médico cuál método [employer] recomienda  · Unirse a un jamey de apoyo o la terapia emocional puede ser beneficioso para afrontar jon sentimientos    Un aborto natural puede ser muy dificil para [place], [place] nara y otros miembros de palmer trang  Después de Han Rizvi pérdida del embarazo se pueden sentir muchos sentimientos  Usted podría sentir naresh carvalho intensa u otros sentimientos  Podría ser beneficioso hablar con Stephanie Osgood, con un familiar o con un consejero acerca de jon sentimientos  Usted también podría estar preocupada por la posibilidad de sufrir otro aborto natural  Consulte con palmer médico acerca de jon preocupaciones  El médico podría ayudarla a disminuir el riesgo de otro aborto  También le podría ayudar a encontrar formas para sobrellevar la carvalho y aflicción que siente  Para más información:   · [employer]nec[employer]  [address] [place] [address]  Phone: [phone]  Phone: [phone]  Web Address: http://Jin-Magic/  org  · March of [facility]AVIORAL HE[facility]  [address] [facility] , [address] [place] [address]  Web Address: [employer]   com  Acuda a jon consultas de control con palmer médico según le indicaron  Usted podría necesitar acudir con palmer médico para que le ordene exámenes de bret o naresh ecografía  Anote jon preguntas para que se acuerde de hacerlas yonathan jon visitas  © 2017 2600 [facility] Information is for End User's use only and may not be sold, redistributed or otherwise used for commercial purposes  All illustrations and images included in CareNotes® are the copyrighted property of A D A M , Inc  or Duc Valenzuela  Esta información es sólo para uso en educación  Palmer intención no es darle un consejo médico sobre enfermedades o tratamientos  Colsulte con palmer Karely Every farmacéutico antes de seguir cualquier régimen médico para saber si es seguro y efectivo para usted

## 2018-04-30 NOTE — LETTER
April 30, 2018     Patient: Mary Obando   YOB: 1990   Date of Visit: 4/30/2018       To Whom it May Concern:    Mary Obando is under my professional care  She was seen in my office on 4/30/2018  Please excuse Daya Phlegm from work 4/30/2018 to 5/1/2018  If you have any questions or concerns, please don't hesitate to call           Sincerely,          Agustin Montana MD        CC: No Recipients

## 2018-04-30 NOTE — PROGRESS NOTES
GYN visit  F/u visit missed AB   2018  Ekonomidis  2018    28 yo [id number] presenting for follow up for missed AB s/p cytotec placement x2 on 18 and 2018  Patient states she is having cramping and vaginal bleeding and has passed 2 sizeable clots that she believes contained tissue on Saturday  She continues to have bleeding  She denies any fever but states she had an episode of chills once and feels tired or "wiped out"  History as follows:   LMP 18: Presented to ED with vaginal bleeding and was noted to have +BHCG with IUP visualized on ultrasound with no cardiac activity  18: Presented to [facility] and with continued vaginal bleeding and was noted to have gestational sac with large irregular yolk sac with no fetal pole clearly visualized  18: Patient presented for follow up ultrasound/ viability scan and had similar findings as previous week and diagnosed with anembryonic pregnancy/ missed   Cytotec 800 mcg dose #1  placed  18: Patient presented to ED with vaginal bleeding and pelvic pain  Was noted to have gestational sac in lower uterine segment  Cytotec 800 mcg dose #2 placed  /77   Temp 98 °F (36 7 °C)   Ht 5' 5" (1 651 m)   Wt 60 3 kg (133 lb)   BMI 22 13 kg/m²   General: Appears calm and in no acute distress  TVUS: Clot material noted in uterus- no visible gestational sac  SSE: Cervix appears visually dilated with tissue visible in cervical os, blood seen in vaginal vault- tissue was removed using ring forceps with a total of 50cc tissue/clot material removed and approximately 100cc blood loss noted during this time      A/P:  28 yo [id number] s/p removal of POC from cervical os following missed AB   - POC sent to pathology for tissue examination  - Patient given 600mg Motrin PO following removal of POC  - Discussed contraception with patient and she will return at a later time to discuss   - Patient was advised nothing per vagina for next 2 weeks  - She was told for pain she may take motrin 600mg PO Q6hrs and Tylenol 325mg PO Q4hrs  - She was given warning signs of infection and excessive bleeding and instructed to present to ED with any of these problems  - To return in 2 weeks for a follow up visit and to discuss contraception or sooner if needed      Patient seen and examined with Dr Shavon Summers

## 2018-05-14 ENCOUNTER — OFFICE VISIT (OUTPATIENT)
Dept: OBGYN CLINIC | Facility: HOSPITAL | Age: 28
End: 2018-05-14
Payer: COMMERCIAL

## 2018-05-14 VITALS
SYSTOLIC BLOOD PRESSURE: 97 MMHG | HEART RATE: 66 BPM | BODY MASS INDEX: 21.92 KG/M2 | HEIGHT: 66 IN | DIASTOLIC BLOOD PRESSURE: 63 MMHG | WEIGHT: 136.4 LBS

## 2018-05-14 DIAGNOSIS — O02.1 MISSED ABORTION: Primary | ICD-10-CM

## 2018-05-14 PROCEDURE — 99213 OFFICE O/P EST LOW 20 MIN: CPT | Performed by: OBSTETRICS & GYNECOLOGY

## 2018-05-14 NOTE — PROGRESS NOTES
Assessment/Plan:    1  Missed    -status post 2 doses of Cytotec, with passage of products of conception   -patient will monitor bleeding and return to clinic if bleeding increases   -continue Motrin 600 mg Q 6h as needed for pain/cramping  2  Contraception   -patient declines contraception at this time  Subjective:      Patient ID: Stephen Ahn is a 29 y o  female  Translation via language line    28 yo [id number] here for f/u for MAB dx on   She received Cytotec on  and   Since that time she notes that she has had vaginal bleeding and passage of clot and tissue  Patient states today that she passed another small amount of what appeared to be tissue as well as blood clots  She notes that she is still having some cramping  Pelvic exam today notable for a 3 cm blood clot in vaginal vault, but no active bleeding  Transvaginal ultrasound (performed with Dr Belle Holguin) with no evidence of gestational sac or products of conception  Endometrial lining measured 0 75 cm  Discussed with patient that it appears that she has passed all products of conception and that only some blood clots noted in endometrial cavity  Discussed with patient that she may expect to have some more bleeding, but that if bleeding exceeds saturation of a pad every hour that she should contact the office immediately  Discuss contraceptive options with patient, however patient declines all contraception at this time and states that she is not planning on being sexually active    Discussed with patient that if she is sexually active that she should use condoms and return to discuss contraception        The following portions of the patient's history were reviewed and updated as appropriate: allergies, current medications, past family history, past medical history, past social history, past surgical history and problem list     History as follows (from previous visit):   LMP 18: Presented to ED with vaginal bleeding and was noted to have +BHCG with IUP visualized on ultrasound with no cardiac activity  18: Presented to [facility] and with continued vaginal bleeding and was noted to have gestational sac with large irregular yolk sac with no fetal pole clearly visualized  18: Patient presented for follow up ultrasound/ viability scan and had similar findings as previous week and diagnosed with anembryonic pregnancy/ missed   Cytotec 800 mcg dose #1  placed  18: Patient presented to ED with vaginal bleeding and pelvic pain  Was noted to have gestational sac in lower uterine segment  Cytotec 800 mcg dose #2 placed  Review of Systems   Constitutional: Negative  HENT: Negative  Respiratory: Negative  Cardiovascular: Negative  Gastrointestinal: Negative  Genitourinary: Positive for pelvic pain and vaginal bleeding  Objective:      BP 97/63 (BP Location: Right arm, Patient Position: Sitting, Cuff Size: Standard)   Pulse 66   Ht 5' 6" (1 676 m)   Wt 61 9 kg (136 lb 6 4 oz)   BMI 22 02 kg/m²          Physical Exam   Constitutional: She is oriented to person, place, and time  She appears well-developed and well-nourished  No distress  HENT:   Head: Normocephalic and atraumatic  Cardiovascular: Normal rate  Pulmonary/Chest: Effort normal  No respiratory distress  Abdominal: She exhibits no distension  There is no tenderness  Genitourinary:   Genitourinary Comments: Small 3-4cm clot in vaginal vault, no active bleeding from cervical os noted     Neurological: She is alert and oriented to person, place, and time  Skin: Skin is warm and dry  Psychiatric: She has a normal mood and affect   Her behavior is normal

## 2018-07-17 ENCOUNTER — HOSPITAL ENCOUNTER (EMERGENCY)
Facility: HOSPITAL | Age: 28
Discharge: HOME | End: 2018-07-17
Attending: EMERGENCY MEDICINE
Payer: COMMERCIAL

## 2018-07-17 VITALS
RESPIRATION RATE: 16 BRPM | OXYGEN SATURATION: 99 % | BODY MASS INDEX: 21.79 KG/M2 | SYSTOLIC BLOOD PRESSURE: 117 MMHG | DIASTOLIC BLOOD PRESSURE: 64 MMHG | HEART RATE: 85 BPM | TEMPERATURE: 98.1 F | WEIGHT: 135 LBS

## 2018-07-17 DIAGNOSIS — Z34.90 PREGNANCY: Primary | ICD-10-CM

## 2018-07-17 DIAGNOSIS — R11.2 NAUSEA AND VOMITING: ICD-10-CM

## 2018-07-17 DIAGNOSIS — R19.7 DIARRHEA: ICD-10-CM

## 2018-07-17 DIAGNOSIS — R10.9 ABDOMINAL PAIN: ICD-10-CM

## 2018-07-17 LAB
ALBUMIN SERPL BCP-MCNC: 3.2 G/DL (ref 3.5–5)
ALP SERPL-CCNC: 63 U/L (ref 46–116)
ALT SERPL W P-5'-P-CCNC: 17 U/L (ref 12–78)
ANION GAP SERPL CALCULATED.3IONS-SCNC: 3 MMOL/L (ref 4–13)
AST SERPL W P-5'-P-CCNC: 9 U/L (ref 5–45)
BACTERIA UR QL AUTO: NORMAL /HPF
BASOPHILS # BLD AUTO: 0.03 THOUSANDS/ΜL (ref 0–0.1)
BASOPHILS NFR BLD AUTO: 1 % (ref 0–1)
BILIRUB SERPL-MCNC: 0.16 MG/DL (ref 0.2–1)
BILIRUB UR QL STRIP: NEGATIVE
BUN SERPL-MCNC: 11 MG/DL (ref 5–25)
CALCIUM SERPL-MCNC: 8.8 MG/DL (ref 8.3–10.1)
CHLORIDE SERPL-SCNC: 104 MMOL/L (ref 100–108)
CLARITY UR: CLEAR
CO2 SERPL-SCNC: 27 MMOL/L (ref 21–32)
COLOR UR: YELLOW
COLOR, POC: YELLOW
CREAT SERPL-MCNC: 0.7 MG/DL (ref 0.6–1.3)
EOSINOPHIL # BLD AUTO: 0.17 THOUSAND/ΜL (ref 0–0.61)
EOSINOPHIL NFR BLD AUTO: 3 % (ref 0–6)
ERYTHROCYTE [DISTWIDTH] IN BLOOD BY AUTOMATED COUNT: 15.2 % (ref 11.6–15.1)
EXT PREG TEST URINE: POSITIVE
GFR SERPL CREATININE-BSD FRML MDRD: 118 ML/MIN/1.73SQ M
GLUCOSE SERPL-MCNC: 79 MG/DL (ref 65–140)
GLUCOSE UR STRIP-MCNC: NEGATIVE MG/DL
HCT VFR BLD AUTO: 37.6 % (ref 34.8–46.1)
HGB BLD-MCNC: 11.9 G/DL (ref 11.5–15.4)
HGB UR QL STRIP.AUTO: ABNORMAL
HYALINE CASTS #/AREA URNS LPF: NORMAL /LPF
IMM GRANULOCYTES # BLD AUTO: 0.02 THOUSAND/UL (ref 0–0.2)
IMM GRANULOCYTES NFR BLD AUTO: 0 % (ref 0–2)
KETONES UR STRIP-MCNC: NEGATIVE MG/DL
LEUKOCYTE ESTERASE UR QL STRIP: NEGATIVE
LIPASE SERPL-CCNC: 163 U/L (ref 73–393)
LYMPHOCYTES # BLD AUTO: 1.79 THOUSANDS/ΜL (ref 0.6–4.47)
LYMPHOCYTES NFR BLD AUTO: 28 % (ref 14–44)
MCH RBC QN AUTO: 25.2 PG (ref 26.8–34.3)
MCHC RBC AUTO-ENTMCNC: 31.6 G/DL (ref 31.4–37.4)
MCV RBC AUTO: 80 FL (ref 82–98)
MONOCYTES # BLD AUTO: 0.58 THOUSAND/ΜL (ref 0.17–1.22)
MONOCYTES NFR BLD AUTO: 9 % (ref 4–12)
NEUTROPHILS # BLD AUTO: 3.77 THOUSANDS/ΜL (ref 1.85–7.62)
NEUTS SEG NFR BLD AUTO: 59 % (ref 43–75)
NITRITE UR QL STRIP: NEGATIVE
NON-SQ EPI CELLS URNS QL MICRO: NORMAL /HPF
NRBC BLD AUTO-RTO: 0 /100 WBCS
PH UR STRIP.AUTO: 7 [PH] (ref 4.5–8)
PLATELET # BLD AUTO: 176 THOUSANDS/UL (ref 149–390)
PMV BLD AUTO: 10.8 FL (ref 8.9–12.7)
POTASSIUM SERPL-SCNC: 3.6 MMOL/L (ref 3.5–5.3)
PROT SERPL-MCNC: 6.6 G/DL (ref 6.4–8.2)
PROT UR STRIP-MCNC: NEGATIVE MG/DL
RBC # BLD AUTO: 4.73 MILLION/UL (ref 3.81–5.12)
RBC #/AREA URNS AUTO: NORMAL /HPF
SODIUM SERPL-SCNC: 134 MMOL/L (ref 136–145)
SP GR UR STRIP.AUTO: 1.02 (ref 1–1.03)
UROBILINOGEN UR QL STRIP.AUTO: 1 E.U./DL
WBC # BLD AUTO: 6.36 THOUSAND/UL (ref 4.31–10.16)
WBC #/AREA URNS AUTO: NORMAL /HPF

## 2018-07-17 PROCEDURE — 99284 EMERGENCY DEPT VISIT MOD MDM: CPT

## 2018-07-17 PROCEDURE — 85025 COMPLETE CBC W/AUTO DIFF WBC: CPT | Performed by: EMERGENCY MEDICINE

## 2018-07-17 PROCEDURE — 80053 COMPREHEN METABOLIC PANEL: CPT | Performed by: EMERGENCY MEDICINE

## 2018-07-17 PROCEDURE — 83690 ASSAY OF LIPASE: CPT | Performed by: EMERGENCY MEDICINE

## 2018-07-17 PROCEDURE — 96360 HYDRATION IV INFUSION INIT: CPT

## 2018-07-17 PROCEDURE — 36415 COLL VENOUS BLD VENIPUNCTURE: CPT | Performed by: EMERGENCY MEDICINE

## 2018-07-17 PROCEDURE — 81001 URINALYSIS AUTO W/SCOPE: CPT

## 2018-07-17 PROCEDURE — 81025 URINE PREGNANCY TEST: CPT | Performed by: EMERGENCY MEDICINE

## 2018-07-17 RX ORDER — ACETAMINOPHEN 500 MG
500 TABLET ORAL EVERY 6 HOURS PRN
Qty: 30 TABLET | Refills: 0 | Status: SHIPPED | OUTPATIENT
Start: 2018-07-17

## 2018-07-17 RX ORDER — ACETAMINOPHEN 500 MG
500 TABLET ORAL EVERY 6 HOURS PRN
Qty: 30 TABLET | Refills: 0 | Status: SHIPPED | OUTPATIENT
Start: 2018-07-17 | End: 2018-07-17

## 2018-07-17 RX ORDER — ONDANSETRON 2 MG/ML
4 INJECTION INTRAMUSCULAR; INTRAVENOUS ONCE
Status: DISCONTINUED | OUTPATIENT
Start: 2018-07-17 | End: 2018-07-17 | Stop reason: HOSPADM

## 2018-07-17 RX ORDER — MORPHINE SULFATE 4 MG/ML
4 INJECTION, SOLUTION INTRAMUSCULAR; INTRAVENOUS ONCE
Status: DISCONTINUED | OUTPATIENT
Start: 2018-07-17 | End: 2018-07-17

## 2018-07-17 RX ORDER — ONDANSETRON 4 MG/1
4 TABLET, FILM COATED ORAL EVERY 6 HOURS
Qty: 12 TABLET | Refills: 0 | Status: SHIPPED | OUTPATIENT
Start: 2018-07-17 | End: 2018-07-17

## 2018-07-17 RX ORDER — ONDANSETRON 4 MG/1
4 TABLET, FILM COATED ORAL EVERY 6 HOURS
Qty: 12 TABLET | Refills: 0 | Status: SHIPPED | OUTPATIENT
Start: 2018-07-17 | End: 2018-08-09

## 2018-07-17 RX ADMIN — SODIUM CHLORIDE 1000 ML: 0.9 INJECTION, SOLUTION INTRAVENOUS at 19:23

## 2018-07-17 NOTE — ED NOTES
TV US completed at bedside with dr Lyric Ruano and dr Myrna Gardner with this RN accompanying  Ebony Almodovar, MURALI  07/17/18 8984

## 2018-07-17 NOTE — ED PROVIDER NOTES
History  Chief Complaint   Patient presents with    Abdominal Pain     pt has been having N/V/D for the past three days and then started with R sided abd pain and back pain  66-year-old female presents to the emergency department for evaluation of nausea vomiting diarrhea and right lower quadrant abdominal pain for 3 days  Patient states that her children had episodes of diarrhea prior to the onset of her symptoms  Patient does have a past history of  and  3 months ago  She states that she has not had her administration since her  back in April  She denies having any vaginal discharge she is sexually active  She denies having any history of STDs  Patient states that the pain and for right lower quadrant is intermittent and has not taken any medications for it  She has not had this kind of pain before  Otherwise, patient denies having any fever, chest pain, shortness of breath, constipation  Prior to Admission Medications   Prescriptions: None   Facility-Administered Medications Last Administration Doses Remaining   ibuprofen (MOTRIN) tablet 600 mg None recorded 1          Past Medical History:   Diagnosis Date    Miscarriage        Past Surgical History:   Procedure Laterality Date     SECTION         Family History   Problem Relation Age of Onset    No Known Problems Mother     No Known Problems Father      I have reviewed and agree with the history as documented  Social History   Substance Use Topics    Smoking status: Never Smoker    Smokeless tobacco: Never Used    Alcohol use No        Review of Systems   Constitutional: Negative for appetite change, chills, diaphoresis, fatigue and fever  HENT: Negative for congestion, ear discharge, ear pain, hearing loss, postnasal drip, rhinorrhea, sneezing and sore throat  Eyes: Negative for pain, discharge and redness     Respiratory: Negative for choking, chest tightness, shortness of breath, wheezing and stridor  Cardiovascular: Negative for chest pain and palpitations  Gastrointestinal: Positive for abdominal pain  Negative for abdominal distention, blood in stool, constipation, diarrhea, nausea and vomiting  Genitourinary: Negative for decreased urine volume, difficulty urinating, dysuria, flank pain, frequency and hematuria  Musculoskeletal: Negative for arthralgias, gait problem, joint swelling and neck pain  Skin: Negative for color change, pallor and rash  Allergic/Immunologic: Negative for environmental allergies, food allergies and immunocompromised state  Neurological: Negative for dizziness, seizures, weakness, light-headedness, numbness and headaches  Hematological: Negative for adenopathy  Does not bruise/bleed easily  Psychiatric/Behavioral: Negative for agitation and behavioral problems  Physical Exam  ED Triage Vitals   Temperature Pulse Respirations Blood Pressure SpO2   07/17/18 1843 07/17/18 1843 07/17/18 1843 07/17/18 1843 07/17/18 1843   98 1 °F (36 7 °C) 85 16 117/64 99 %      Temp Source Heart Rate Source Patient Position - Orthostatic VS BP Location FiO2 (%)   07/17/18 1843 07/17/18 1843 07/17/18 1843 07/17/18 1843 --   Tympanic Monitor Sitting Right arm       Pain Score       07/17/18 1847       8           Orthostatic Vital Signs  Vitals:    07/17/18 1843   BP: 117/64   Pulse: 85   Patient Position - Orthostatic VS: Sitting       Physical Exam   Constitutional: She is oriented to person, place, and time  She appears well-developed and well-nourished  HENT:   Head: Normocephalic and atraumatic  Nose: Nose normal    Mouth/Throat: Oropharynx is clear and moist    Eyes: Conjunctivae and EOM are normal  Pupils are equal, round, and reactive to light  Neck: Normal range of motion  Neck supple  Cardiovascular: Normal rate, regular rhythm and normal heart sounds  Exam reveals no gallop and no friction rub  No murmur heard    Pulmonary/Chest: Effort normal and breath sounds normal    Abdominal: Soft  Bowel sounds are normal  She exhibits no distension  There is no tenderness  There is no rebound and no guarding  Musculoskeletal: Normal range of motion  Neurological: She is alert and oriented to person, place, and time  She has normal reflexes  Skin: Skin is warm and dry  No erythema  No pallor  Psychiatric: She has a normal mood and affect  Her behavior is normal    Nursing note and vitals reviewed  ED Medications  Medications   sodium chloride 0 9 % bolus 1,000 mL (0 mL Intravenous Stopped 7/17/18 2023)       Diagnostic Studies  Results Reviewed     Procedure Component Value Units Date/Time    Urine Microscopic [[id number]]  (Normal) Collected:  07/17/18 1921    Lab Status:  Final result Specimen:  Urine from Urine, Clean Catch Updated:  07/17/18 1939     RBC, UA None Seen /hpf      WBC, UA None Seen /hpf      Epithelial Cells Occasional /hpf      Bacteria, UA Occasional /hpf      Hyaline Casts, UA None Seen /lpf     Comprehensive metabolic panel [[id number]]  (Abnormal) Collected:  07/17/18 1918    Lab Status:  Final result Specimen:  Blood from Arm, Left Updated:  07/17/18 1936     Sodium 134 (L) mmol/L      Potassium 3 6 mmol/L      Chloride 104 mmol/L      CO2 27 mmol/L      Anion Gap 3 (L) mmol/L      BUN 11 mg/dL      Creatinine 0 70 mg/dL      Glucose 79 mg/dL      Calcium 8 8 mg/dL      AST 9 U/L      ALT 17 U/L      Alkaline Phosphatase 63 U/L      Total Protein 6 6 g/dL      Albumin 3 2 (L) g/dL      Total Bilirubin 0 16 (L) mg/dL      eGFR 118 ml/min/1 73sq m     Narrative:         National Kidney Disease Education Program recommendations are as follows:  GFR calculation is accurate only with a steady state creatinine  Chronic Kidney disease less than 60 ml/min/1 73 sq  meters  Kidney failure less than 15 ml/min/1 73 sq  meters      Lipase [[id number]]  (Normal) Collected:  07/17/18 1918    Lab Status:  Final result Specimen:  Blood from Arm, Left Updated:  07/17/18 1936     Lipase 163 u/L     CBC and differential [[id number]]  (Abnormal) Collected:  07/17/18 1918    Lab Status:  Final result Specimen:  Blood from Arm, Left Updated:  07/17/18 1931     WBC 6 36 Thousand/uL      RBC 4 73 Million/uL      Hemoglobin 11 9 g/dL      Hematocrit 37 6 %      MCV 80 (L) fL      MCH 25 2 (L) pg      MCHC 31 6 g/dL      RDW 15 2 (H) %      MPV 10 8 fL      Platelets 167 Thousands/uL      nRBC 0 /100 WBCs      Neutrophils Relative 59 %      Immat GRANS % 0 %      Lymphocytes Relative 28 %      Monocytes Relative 9 %      Eosinophils Relative 3 %      Basophils Relative 1 %      Neutrophils Absolute 3 77 Thousands/µL      Immature Grans Absolute 0 02 Thousand/uL      Lymphocytes Absolute 1 79 Thousands/µL      Monocytes Absolute 0 58 Thousand/µL      Eosinophils Absolute 0 17 Thousand/µL      Basophils Absolute 0 03 Thousands/µL     POCT urinalysis dipstick [[id number]]  (Normal) Resulted:  07/17/18 1911    Lab Status:  Final result Specimen:  Urine Updated:  07/17/18 1914     Color, UA Yellow    POCT pregnancy, urine [[id number]]  (Abnormal) Resulted:  07/17/18 1911    Lab Status:  Final result Updated:  07/17/18 1914     EXT PREG TEST UR (Ref: Negative) Positive    ED Urine Macroscopic [[id number]]  (Abnormal) Collected:  07/17/18 1921    Lab Status:  Final result Specimen:  Urine Updated:  07/17/18 1911     Color, UA Yellow     Clarity, UA Clear     pH, UA 7 0     Leukocytes, UA Negative     Nitrite, UA Negative     Protein, UA Negative mg/dl      Glucose, UA Negative mg/dl      Ketones, UA Negative mg/dl      Urobilinogen, UA 1 0 E U /dl      Bilirubin, UA Negative     Blood, UA Trace (A)     Specific Hustle, UA 1 025    Narrative:       [facility] RESULT                 No orders to display         Procedures  Procedures      Phone Consults  ED Phone Contact    ED Course                               MDM  Number of Diagnoses or Management Options  Abdominal pain:   Diarrhea: Nausea and vomiting:   Pregnancy:   Diagnosis management comments: 29year old female presents to the ED for evaluation of abdominal pain    MDM: I will order cbc, cmp, lipase, urinalysis,pregancy test      CritCare Time    Disposition  Final diagnoses:   Pregnancy   Abdominal pain   Nausea and vomiting   Diarrhea     Time reflects when diagnosis was documented in both MDM as applicable and the Disposition within this note     Time User Action Codes Description Comment    7/17/2018  7:56 PM Stephanie Delaney [M97 31] Pregnancy     7/17/2018  7:59 PM [facility] Lathe Add [R10 9] Abdominal pain     7/17/2018  7:59 PM [facility] Lathe Add [R11 2] Nausea and vomiting     7/17/2018  7:59 PM [facility] Lat Add [R19 7] Diarrhea       ED Disposition     ED Disposition Condition Comment    Discharge  Bushra Anderson discharge to home/self care  Condition at discharge: Stable        Follow-up Information     Follow up With Specialties Details Why [id number] [facility]  Congratulations on your pregnancy! Call and schedule your prenatal intake visit with our office  [address] [id number] [phone]          Discharge Medication List as of 7/17/2018  7:59 PM      START taking these medications    Details   acetaminophen (TYLENOL) 500 mg tablet Take 1 tablet (500 mg total) by mouth every 6 (six) hours as needed for mild pain, Starting Tue 7/17/2018, Print      ondansetron (ZOFRAN) 4 mg tablet Take 1 tablet (4 mg total) by mouth every 6 (six) hours, Starting Tue 7/17/2018, Print             Outpatient Discharge Orders  Prenatal Panel   Standing Status: Future  Standing Exp  Date: 07/17/19     Prenatal Panel   Standing Status: Future  Standing Exp  Date: 07/17/19         ED Provider  Attending physically available and evaluated Bushra Anderson  I managed the patient along with the ED Attending      Electronically Signed by         Quique Nicole Nettie Blake MD  07/20/18 9069

## 2018-07-17 NOTE — DISCHARGE INSTRUCTIONS
Sigue con tu obstretrica en 3-5 vogel  Llama pa hacer naresh lizzy  Si tus sintomas se ponen peor, si tienes fiebre, regrese al departemento de emergencia  Un embarazo,   LO QUE NECESITA SABER:   Un embarazo normal dura alrededor de 40 semanas  El primer trimestre dura desde braden último periodo hasta la semana 12 de [place]  El kirill trimestre se extiende desde la semana 13 de braden embarazo hasta la semana 23  El tercer trimestre se extiende desde la semana 24 de embarazo hasta que nazca braden bebé  Si usted conoce la fecha de braden último periodo, braden médico puede calcular la fecha de nacimiento de braden bebé  Es posible que usted de a mateo a braden bebé en cualquier momento desde la semana 37 hasta 2 semanas después de la fecha calculada de [place]  INSTRUCCIONES SOBRE EL SAPNA HOSPITALARIA:   Regrese a la jose miguel de emergencias si:   · Usted presenta un sarah dolor de juani que no desaparece  · Usted tiene cambios en la visión nuevos o en aumento, thao visión borrosa o con manchas  · Usted tiene inflamación nueva o creciente en braden quentin o braden  · Usted tiene dolor o cólicos en el abdomen o la parte baja de la espalda  · Usted tiene sangrado vaginal   Comuníquese con braden médico o obstreta si:   · Usted tiene calambres, presión o tensión abdominal     · Usted tiene un cambio en la secreción vaginal     · Usted no puede retener alimentos ni líquidos y está perdiendo [place]  · Usted tiene escalofríos o fiebre  · Usted tiene comezón, ardor o dolor vaginal      · Usted tiene naresh secreción vaginal amarillenta, verdosa, jazmin o de [employer]  · Usted tiene dolor o ardor al Cherie Gull, orina menos de lo habitual o tiene [place] rosada o sanguinolenta  · Usted tiene preguntas o inquietudes acerca de braden condición o cuidado  Medicamentos:   · Las vitaminas prenatales  suministran parte de las vitaminas y minerales adicionales que usted necesita yonathan el [facility]   Las vitaminas prenatales también podrían ayudar a disminuir el riesgo de ciertos defectos de nacimiento  · [place] jon medicamentos thao se le haya indicado  Consulte con braden médico si usted raman que braden medicamento no le está ayudando o si presenta efectos secundarios  Infórmele si es alérgico a cualquier medicamento  Mantenga naresh lista actualizada de los [place], las vitaminas y los productos herbales que mariluz  Incluya los siguientes datos de los medicamentos: cantidad, frecuencia y motivo de administración  Traiga con usted la lista o los envases de la píldoras a jon citas de seguimiento  Lleve la lista de los medicamentos con usted en [place] de narseh emergencia  Programe un control con braden médico u obstetra según lo indicado:  Vaya a todas jon citas prenatales yonathan braden embarazo  Anote jon preguntas para que se acuerde de hacerlas yonathan jon visitas  Cambios corporales que pueden ocurrir yonathan braden embarazo:   · Los cambios en los senos  que usted Minetta Corti sensibilidad y cosquilleo yonathan la primera parte de braden [facility]  Los senos se volverán más grandes  Es posible que necesite un sostén con soporte  Es posible que usted henrique [facility] secreción delgada y MILDRED, conocida thao calostro, que sale de jon pezones yonathan el kirill trimestre  El calostro es un líquido que se convertirá en [place] alrededor de 3 días después de usted boaz dado a mateo  · Cambios en la piel y estrías  podrían ocurrir yonathan braden embarazo  Es posible que usted tenga marcas bhakta, conocidas thao estrías, en braden piel  Las [employer] se desvanecen después del [facility]  Utilice crema si braden piel está seca y con comezón  La piel de braden quentin, alrededor de los pezones y debajo de braden ombligo podría oscurecerse  La mayoría del [place], braden piel Milady Ohms a braden color normal después del nacimiento de braden bebé  · El malestar matutino  consiste en náuseas y vómitos que pueden ocurrir en cualquier momento del día  Evite los alimentos grasosos y picantes   Coma comidas pequeñas yonathan el día en vez de porciones grandes  El jengibre puede ayudar a [employer]  Consulte con braden médico acerca de otras formas para disminuir las náuseas y el vómito  · Acidez estomacal  puede ser causada por los cambios hormonales yonathan braden [place]  El [employer] en crecimiento puede empujar braden estómago hacia arriba y forzar ácido estomacal a acumularse dentro de braden esófago  [place] 4 o 5 comidas pequeñas cada día en vez de comidas grandes  Evite los alimentos picantes  Evite comer clementina antes de irse a la cama  · Estreñimiento  puede desarrollarse yonathan braden embarazo  Para tratar el estreñimiento, coma alimentos altos en fibra thao cereales con fibra, frijoles, frutas, verduras, panes integrales y [place]  Ann Herb de Ann regular y tome suficiente agua  Es posible que braden médico sugiera un suplemento con fibra para ablandar jon evacuaciones intestinales  Consulte con braden médico antes de usar cualquier medicamento para disminuir el estreñimiento  · Las hemorroides  son Selinda Joseph grandes en el área rectal  Pueden causar dolor, comezón y sangrado de color lundberg vivo en braden recto  Para disminuir el riesgo de hemorroides, prevenga el estreñimiento y no se esfuerce cuando tenga naresh evacuación intestinal  Si usted tiene hemorroides, sumérjase en naresh bañera con agua tibia para aliviar la incomodidad  Consulte con braden médico cómo puede tratar las hemorroides  · Los calambres y la hinchazón en las piernas  pueden ser causados por niveles bajos de calcio o por el peso adicional del [facility]  Eleve jon piernas por encima del nivel de braden corazón para disminuir la hinchazón  Yonathan un calambre en la pierna, estire o de un masaje al [employer] que tiene el calambre  El calor puede ayudar a disminuir el dolor y los espasmos musculares  Aplique calor sobre el músculo por 20 a 30 minutos cada 2 horas por la cantidad de días que se le indique      · Dolor en la espalda  puede ocurrir a medida que braden bebé crece  No esté de pie por largos periodos de tiempo ni levante objetos pesados  Use naresh buena postura mientras esté de pie, se agache o se doble  Use zapatos de tacón bajo con un buen soporte  Descansar puede también ayudarla a aliviar el dolor de espalda  Pregunte a braden médico acerca de ejercicios que usted pueda hacer para fortalecer los músculos de braden espalda  Manténgase saludable yonathan braden embarazo:   · Consuma alimentos saludables y variados  Alimentos saludables incluyen frutas, verduras, panes de heriberto integral, alimentos lácteos bajos en grasa, frijoles, gabe magras y pescado  [place] líquidos thao se le haya indicado  Pregunte cuánto líquido debe lali cada día y cuáles líquidos son los más adecuados para usted  Limite el consumo de cafeína a menos de [place] [address]  Limite el consumo de pescado a 2 porciones cada semana  Escoja pescado con concentraciones bajas de mee thao atún ligero enlatado, camarón, cangrejo, salmón, bacalao o tilapia  No  coma pescado con concentraciones altas de mee thao pez mabel, caballa gigante, pargo rayado y tiburón  · [id number] [place]  Braden necesidad de ciertas vitaminas y [facility], thao el ácido fólico, aumenta yonathan el [facility]  Las vitaminas prenatales proporcionan algunas de las vitaminas y minerales adicionales que usted necesita  Las vitaminas prenatales también podrían ayudar a disminuir el riesgo de ciertos defectos de nacimiento  · Pregunte cuánto peso usted debe aumentar yonathan braden embarazo  Demasiado aumento de peso o muy poco puede ser poco saludable para usted y braden bebé  · Consulte con braden médico acerca de hacer ejercicio  El ejercicio moderado puede ayudarla a mantenerse en forma  Braden médico la ayudará a planear un programa de ejercicios que sea seguro para usted yonathan braden [place]  · No fume  Si usted fuma, nunca es demasiado tarde para dejar de hacerlo   Fumar aumenta el riesgo de aborto [facility] y [address] [place] [address] de syd yonathan braden [place]  Fumar puede causar que braden bebé nazca antes de tiempo o que pese menos al nacer  Solicite información a braden médico si usted necesita ayuda para dejar de fumar  · No consuma alcohol  El alcohol pasa de braden cuerpo al bebé a través de la placenta  Puede afectar el desarrollo del cerebro de braden bebé y provocar el síndrome de alcoholismo fetal (SAF)  FAS es un jamey de condiciones que causan [address] [place] [address] [address], de comportamiento y de crecimiento  · Consulte con braden médico antes de lali cualquier medicamento  Muchos medicamentos pueden perjudicar a braden bebé si usted los mariluz [address] [place] [address] Avenue  No tome ningún medicamento, vitaminas, hierbas o suplementos sin iban consultar con braden Jos Nares  use drogas ilegales o de la hernandez (thao marihuana o cocaína) mientras está embarazada  Consejos de seguridad:   · Evite jacuzzis y saunas  No use un jacuzzi o un sauna mientras usted está embarazada, especialmente yonathan el primer trimestre  Los [facility] y los saunas aumentan la temperatura de braden bebé y el riesgo de defectos de nacimiento  · Evite la toxoplasmosis  [place] es naresh infección causada por comer carne cruda o estar cerca del excremento de un han infectado  [place] puede causar malformaciones congénitas, aborto [facility] y [address] [place] [address] Place  Lávese las braden después de tocar carne cruda  Asegúrese de que la carne esté zakiya cocida antes de comerla  Evite los huevos crudos y la [place]  Use guantes o pida que alguien la ayude a limpiar la caja de arena del han mientras usted Lety Alvarez  · Consulte con braden médico acerca de viajar  El [address] cómodo para viajar es yonathan el kirill trimestre  Pregunte a braden médico si usted puede viajar después de las 36 semanas  Es posible que no pueda viajar en avión después de las 36 [facility]  También le puede recomendar que evite largos viajes por yue    © 2017 [employer] Information is for End User's use only and may not be sold, redistributed or otherwise used for commercial purposes  All illustrations and images included in CareNotes® are the copyrighted property of A D A M , Inc  or Duc Valenzuela  Esta información es sólo para uso en educación  Braden intención no es darle un consejo médico sobre enfermedades o tratamientos  Colsulte con braden Florencio So farmacéutico antes de seguir cualquier régimen médico para saber si es seguro y efectivo para usted

## 2018-07-17 NOTE — ED ATTENDING ATTESTATION
Zena Blue DO, saw and evaluated the patient  I have discussed the patient with the resident/non-physician practitioner and agree with the resident's/non-physician practitioner's findings, Plan of Care, and MDM as documented in the resident's/non-physician practitioner's note, except where noted  All available labs and Radiology studies were reviewed  At this point I agree with the current assessment done in the Emergency Department  I have conducted an independent evaluation of this patient a history and physical is as follows:    30 yo female presents for evaluation of RLQ pain, intermittent  Radiates to back  8/10 at its worst  Associated with NBNB emesis, watery diarrhea, dysuria  Ate eggs this AM without difficulty  +sick contacts     in April  LMP at that time  Sexually active, no hx STI    abd snd moderately RLQ abd TTP no r/g bs+  +rovsing +psoas  +R CVAT    Imp: RLQ abd pain plan: UA, upreg, abd labs, CT abd/pelvis with contrast  tx sx  Reassess        Critical Care Time  CritCare Time    Procedures

## 2018-07-31 ENCOUNTER — APPOINTMENT (OUTPATIENT)
Dept: LAB | Facility: HOSPITAL | Age: 28
End: 2018-07-31
Attending: EMERGENCY MEDICINE
Payer: COMMERCIAL

## 2018-07-31 ENCOUNTER — ULTRASOUND (OUTPATIENT)
Dept: OBGYN CLINIC | Facility: HOSPITAL | Age: 28
End: 2018-07-31
Payer: COMMERCIAL

## 2018-07-31 VITALS
SYSTOLIC BLOOD PRESSURE: 90 MMHG | WEIGHT: 135 LBS | DIASTOLIC BLOOD PRESSURE: 58 MMHG | HEART RATE: 90 BPM | BODY MASS INDEX: 22.49 KG/M2 | HEIGHT: 65 IN

## 2018-07-31 DIAGNOSIS — Z32.00 POSSIBLE PREGNANCY: Primary | ICD-10-CM

## 2018-07-31 DIAGNOSIS — Z34.90 PREGNANCY: ICD-10-CM

## 2018-07-31 DIAGNOSIS — Z3A.08 8 WEEKS GESTATION OF PREGNANCY: ICD-10-CM

## 2018-07-31 LAB
ABO GROUP BLD: NORMAL
BASOPHILS # BLD AUTO: 0.02 THOUSANDS/ΜL (ref 0–0.1)
BASOPHILS NFR BLD AUTO: 0 % (ref 0–1)
BILIRUB UR QL STRIP: NEGATIVE
BLD GP AB SCN SERPL QL: NEGATIVE
CLARITY UR: CLEAR
COLOR UR: YELLOW
EOSINOPHIL # BLD AUTO: 0.06 THOUSAND/ΜL (ref 0–0.61)
EOSINOPHIL NFR BLD AUTO: 1 % (ref 0–6)
ERYTHROCYTE [DISTWIDTH] IN BLOOD BY AUTOMATED COUNT: 16 % (ref 11.6–15.1)
GLUCOSE UR STRIP-MCNC: NEGATIVE MG/DL
HBV SURFACE AG SER QL: NORMAL
HCT VFR BLD AUTO: 37.2 % (ref 34.8–46.1)
HGB BLD-MCNC: 11.9 G/DL (ref 11.5–15.4)
HGB UR QL STRIP.AUTO: NEGATIVE
IMM GRANULOCYTES # BLD AUTO: 0.01 THOUSAND/UL (ref 0–0.2)
IMM GRANULOCYTES NFR BLD AUTO: 0 % (ref 0–2)
KETONES UR STRIP-MCNC: NEGATIVE MG/DL
LEUKOCYTE ESTERASE UR QL STRIP: NEGATIVE
LYMPHOCYTES # BLD AUTO: 1.2 THOUSANDS/ΜL (ref 0.6–4.47)
LYMPHOCYTES NFR BLD AUTO: 26 % (ref 14–44)
MCH RBC QN AUTO: 25.2 PG (ref 26.8–34.3)
MCHC RBC AUTO-ENTMCNC: 32 G/DL (ref 31.4–37.4)
MCV RBC AUTO: 79 FL (ref 82–98)
MONOCYTES # BLD AUTO: 0.42 THOUSAND/ΜL (ref 0.17–1.22)
MONOCYTES NFR BLD AUTO: 9 % (ref 4–12)
NEUTROPHILS # BLD AUTO: 2.92 THOUSANDS/ΜL (ref 1.85–7.62)
NEUTS SEG NFR BLD AUTO: 64 % (ref 43–75)
NITRITE UR QL STRIP: NEGATIVE
NRBC BLD AUTO-RTO: 0 /100 WBCS
PH UR STRIP.AUTO: 6.5 [PH] (ref 4.5–8)
PLATELET # BLD AUTO: 171 THOUSANDS/UL (ref 149–390)
PMV BLD AUTO: 10.4 FL (ref 8.9–12.7)
PROT UR STRIP-MCNC: NEGATIVE MG/DL
RBC # BLD AUTO: 4.72 MILLION/UL (ref 3.81–5.12)
RH BLD: POSITIVE
RPR SER QL: NORMAL
RUBV IGG SERPL IA-ACNC: 21.9 IU/ML
SL AMB POCT URINE HCG: POSITIVE
SP GR UR STRIP.AUTO: 1.02 (ref 1–1.03)
SPECIMEN EXPIRATION DATE: NORMAL
UROBILINOGEN UR QL STRIP.AUTO: 0.2 E.U./DL
WBC # BLD AUTO: 4.63 THOUSAND/UL (ref 4.31–10.16)

## 2018-07-31 PROCEDURE — 81025 URINE PREGNANCY TEST: CPT | Performed by: OBSTETRICS & GYNECOLOGY

## 2018-07-31 PROCEDURE — 99212 OFFICE O/P EST SF 10 MIN: CPT | Performed by: OBSTETRICS & GYNECOLOGY

## 2018-07-31 PROCEDURE — 87086 URINE CULTURE/COLONY COUNT: CPT

## 2018-07-31 PROCEDURE — 80081 OBSTETRIC PANEL INC HIV TSTG: CPT

## 2018-07-31 PROCEDURE — 36415 COLL VENOUS BLD VENIPUNCTURE: CPT

## 2018-07-31 PROCEDURE — 81003 URINALYSIS AUTO W/O SCOPE: CPT

## 2018-07-31 NOTE — PROGRESS NOTES
AMB US Pelvic Non OB  Date/Time: 2018 9:12 AM  Performed by: Joanie Gerber  Authorized by: Joanie Gerber     Procedure details:     Indications comment:  Viability     Pelvic ultrasound technique: TVUS  Position: lithotomy exam    Cervix findings:      25-year-old  013 here for viability scan for an incidental finding of pregnancy during emergency department visit for nausea and vomiting  Patient has a history of 3 prior  sections and desires to have repeat  section at this time, she is interested in having a tubal ligation, she will be asked again at 28 weeks for her to sign the form  Denies any history of  deliveries or preeclampsia or any other complications of prior pregnancies  Patient will return to the clinic for a formal prenatal H&P today we had findings consistent with an intrauterine pregnancy of 8 weeks and 5 days with the estimated due date of 2019, fetal heart tones 167  Patient denies any cramping, bleeding or leakage of fluid  Patient was encouraged to start taking prenatal vitamins, states her nausea vomiting have improved

## 2018-08-01 LAB
BACTERIA UR CULT: NORMAL
HIV 1+2 AB+HIV1 P24 AG SERPL QL IA: NORMAL

## 2018-08-09 ENCOUNTER — INITIAL PRENATAL (OUTPATIENT)
Dept: OBGYN CLINIC | Facility: HOSPITAL | Age: 28
End: 2018-08-09
Payer: COMMERCIAL

## 2018-08-09 VITALS
BODY MASS INDEX: 22.19 KG/M2 | HEIGHT: 65 IN | HEART RATE: 67 BPM | DIASTOLIC BLOOD PRESSURE: 63 MMHG | WEIGHT: 133.2 LBS | SYSTOLIC BLOOD PRESSURE: 98 MMHG

## 2018-08-09 DIAGNOSIS — Z3A.10 10 WEEKS GESTATION OF PREGNANCY: Primary | ICD-10-CM

## 2018-08-09 DIAGNOSIS — Z34.90 ENCOUNTER FOR PRENATAL CARE: Primary | ICD-10-CM

## 2018-08-09 PROCEDURE — T1001 NURSING ASSESSMENT/EVALUATN: HCPCS

## 2018-08-09 RX ORDER — PNV NO.95/FERROUS FUM/FOLIC AC 28MG-0.8MG
1 TABLET ORAL DAILY
COMMUNITY
End: 2018-08-09 | Stop reason: SDUPTHER

## 2018-08-09 RX ORDER — PNV NO.95/FERROUS FUM/FOLIC AC 28MG-0.8MG
1 TABLET ORAL DAILY
Qty: 30 TABLET | Refills: 6 | Status: SHIPPED | OUTPATIENT
Start: 2018-08-09

## 2018-08-09 NOTE — LETTER
Stan Solorio is a patient and under our care in our office  Simonpriyanka Tracy Miguel Angeloliverstu's Estimated Date of Delivery: 03/07/2019  Any questions or concerns feel free to contact our office  Thank you,    [facility]  [address] [facility], [address] [facility]  [phone]      [address] [place] [address]/[place] [address]  [address] [facility]/[place]  [phone]     [facility]/[place]   [phone]      [facility]  [phone]     [place]   [address] [facility]

## 2018-08-09 NOTE — LETTER
Norma Alvarez is a patient at our facility  Norma Alvarez Estimated Date of Delivery: 03/07/2019  Any questions or concerns, please feel free to contact our office      Sincerely,     [facility]   [address]   [place], [address] [facility]   [phone]

## 2018-08-09 NOTE — PROGRESS NOTES
MHR=67    OB Intake  o Patient presents for OB intake interview  o Accompanied by: Self  o [id number]  - Hx of  delivery prior to 36 weeks 6 days: no  o LMP: Patient's last menstrual period was 2018 (exact date)  - but had SAB since then and bled the entire month of May, 2018   o U/S date: 2018   - 8 weeks  5 days on 2018  o Estimated Date of Delivery: 2019     - confirmed by U/S  o Signs and Symptoms of pregnancy:  - breast tenderness, fatigue, frequent urination, morning sickness and nausea  - Constipation: no  - Headaches: yes  - Cramping/spotting: no  - PICA cravings: no  - Diabetes: If you answer yes, please order 1 hr gtt testing, 50grams   History of gestational diabetes no   BMI >35  no   Advance maternal age >35 no   First degree relative with type 2 diabetes no   History of PCOS no   Current metformin use no   Prior history of macrosomia or LGA no  o Immunization Record  -   - There is no immunization history on file for this patient   o Tdap:  - Counseled to be given after 28 weeks  - Influenza vaccine discussed  o MRSA questionnaire: negative  o Dental visit within last 6 months  - No- If no, recommendations discussed  [facility]: No- not her first baby  ONAF form submitted: Yes           - Lab slips given for varicella IgG, hemoglobin electrophoresis test, and referral to [facility] for sequential screen  Pt already had PN panel completed in 2018  - Pt travelled [facility] 1 month ago, no s/s of [facility]  Pt does not plan to visit [place] again during the pregnancy  Zika info sheet and education given  - With c/o acid reflux- gave education and info sheet with meds that are safe for her to take in pregnancy  Interview education:  Kimberly Donohue Pregnancy Essentials booklet given to patient  Reviewed and explained      Handouts given at todays visit  o Lobito Isabelacle & me phone application guide  o [facility] & Me support center  o [facility]s Response to [address] [place] [address] Maternal Fetal Medicine  - Sequential screening pamphlet- info given and referral made to MFM  - Cystic fibrosis pamphlet- info given, pt declines testing at this time  o MARCK letter given  - Lucinda Gabriel activated (not 1518 years of age)  - No  - Code provided: Yes, activation code provided with AVS, explained how to activate